# Patient Record
Sex: MALE | Race: WHITE | NOT HISPANIC OR LATINO | Employment: UNEMPLOYED | URBAN - NONMETROPOLITAN AREA
[De-identification: names, ages, dates, MRNs, and addresses within clinical notes are randomized per-mention and may not be internally consistent; named-entity substitution may affect disease eponyms.]

---

## 2021-01-01 ENCOUNTER — TRANSCRIBE ORDERS (OUTPATIENT)
Dept: ADMINISTRATIVE | Facility: HOSPITAL | Age: 56
End: 2021-01-01

## 2021-01-01 ENCOUNTER — HOSPITAL ENCOUNTER (OUTPATIENT)
Dept: GENERAL RADIOLOGY | Facility: HOSPITAL | Age: 56
Discharge: HOME | End: 2021-07-29

## 2021-01-01 DIAGNOSIS — M96.1 POSTLAMINECTOMY SYNDROME, NOT ELSEWHERE CLASSIFIED: ICD-10-CM

## 2021-01-01 DIAGNOSIS — M96.1 POSTLAMINECTOMY SYNDROME, NOT ELSEWHERE CLASSIFIED: Primary | ICD-10-CM

## 2021-01-01 PROCEDURE — 73521 X-RAY EXAM HIPS BI 2 VIEWS: CPT

## 2021-01-01 PROCEDURE — 72100 X-RAY EXAM L-S SPINE 2/3 VWS: CPT | Performed by: RADIOLOGY

## 2021-01-01 PROCEDURE — 72100 X-RAY EXAM L-S SPINE 2/3 VWS: CPT

## 2021-01-01 PROCEDURE — 73521 X-RAY EXAM HIPS BI 2 VIEWS: CPT | Performed by: RADIOLOGY

## 2021-03-04 ENCOUNTER — HOSPITAL ENCOUNTER (EMERGENCY)
Facility: HOSPITAL | Age: 56
Discharge: HOME | End: 2021-03-04
Attending: FAMILY MEDICINE | Admitting: FAMILY MEDICINE

## 2021-03-04 ENCOUNTER — APPOINTMENT (OUTPATIENT)
Dept: CT IMAGING | Facility: HOSPITAL | Age: 56
End: 2021-03-04

## 2021-03-04 ENCOUNTER — APPOINTMENT (OUTPATIENT)
Dept: GENERAL RADIOLOGY | Facility: HOSPITAL | Age: 56
End: 2021-03-04

## 2021-03-04 VITALS
DIASTOLIC BLOOD PRESSURE: 97 MMHG | RESPIRATION RATE: 20 BRPM | HEIGHT: 69 IN | SYSTOLIC BLOOD PRESSURE: 151 MMHG | TEMPERATURE: 97.5 F | BODY MASS INDEX: 22.96 KG/M2 | HEART RATE: 108 BPM | OXYGEN SATURATION: 99 % | WEIGHT: 155 LBS

## 2021-03-04 DIAGNOSIS — S46.911A STRAIN OF RIGHT SHOULDER, INITIAL ENCOUNTER: Primary | ICD-10-CM

## 2021-03-04 PROCEDURE — 73030 X-RAY EXAM OF SHOULDER: CPT | Performed by: RADIOLOGY

## 2021-03-04 PROCEDURE — 73030 X-RAY EXAM OF SHOULDER: CPT

## 2021-03-04 PROCEDURE — 72125 CT NECK SPINE W/O DYE: CPT | Performed by: RADIOLOGY

## 2021-03-04 PROCEDURE — 72125 CT NECK SPINE W/O DYE: CPT

## 2021-03-04 PROCEDURE — 25010000002 KETOROLAC TROMETHAMINE PER 15 MG: Performed by: PHYSICIAN ASSISTANT

## 2021-03-04 PROCEDURE — 99283 EMERGENCY DEPT VISIT LOW MDM: CPT

## 2021-03-04 PROCEDURE — 96372 THER/PROPH/DIAG INJ SC/IM: CPT

## 2021-03-04 RX ORDER — LIDOCAINE 50 MG/G
1 PATCH TOPICAL ONCE
Status: DISCONTINUED | OUTPATIENT
Start: 2021-03-04 | End: 2021-03-04 | Stop reason: HOSPADM

## 2021-03-04 RX ORDER — TIZANIDINE 4 MG/1
4 TABLET ORAL EVERY 8 HOURS PRN
Qty: 30 TABLET | Refills: 0 | Status: SHIPPED | OUTPATIENT
Start: 2021-03-04

## 2021-03-04 RX ORDER — DICLOFENAC SODIUM 75 MG/1
75 TABLET, DELAYED RELEASE ORAL 2 TIMES DAILY
Qty: 30 TABLET | Refills: 0 | Status: SHIPPED | OUTPATIENT
Start: 2021-03-04

## 2021-03-04 RX ORDER — HYDROCODONE BITARTRATE AND ACETAMINOPHEN 7.5; 325 MG/1; MG/1
1 TABLET ORAL EVERY 6 HOURS PRN
Qty: 6 TABLET | Refills: 0 | Status: SHIPPED | OUTPATIENT
Start: 2021-03-04 | End: 2021-03-06 | Stop reason: SDUPTHER

## 2021-03-04 RX ORDER — KETOROLAC TROMETHAMINE 30 MG/ML
60 INJECTION, SOLUTION INTRAMUSCULAR; INTRAVENOUS ONCE
Status: COMPLETED | OUTPATIENT
Start: 2021-03-04 | End: 2021-03-04

## 2021-03-04 RX ADMIN — LIDOCAINE 1 PATCH: 50 PATCH CUTANEOUS at 12:37

## 2021-03-04 RX ADMIN — KETOROLAC TROMETHAMINE 60 MG: 60 INJECTION, SOLUTION INTRAMUSCULAR at 12:37

## 2021-03-04 NOTE — ED PROVIDER NOTES
Subjective     History provided by:  Patient  Shoulder Injury  Location:  Right shoulder / right side neck  Quality:  Pain  Severity:  Moderate  Onset quality:  Sudden  Duration:  12 days  Timing:  Constant  Progression:  Worsening  Chronicity:  New  Context:  Pt slipped on ice falling injuring neck and shoulder.  Neck pain has improved but shoulder pain has gotten worse.   Worsened by:  Movement  Associated symptoms: no abdominal pain, no chest pain and no fever        Review of Systems   Constitutional: Negative.  Negative for fever.   HENT: Negative.    Respiratory: Negative.    Cardiovascular: Negative.  Negative for chest pain.   Gastrointestinal: Negative.  Negative for abdominal pain.   Endocrine: Negative.    Genitourinary: Negative.  Negative for dysuria.   Musculoskeletal: Positive for arthralgias.   Skin: Negative.    Neurological: Negative.    Psychiatric/Behavioral: Negative.    All other systems reviewed and are negative.      No past medical history on file.    Allergies   Allergen Reactions   • Mupirocin Hives       No past surgical history on file.    No family history on file.    Social History     Socioeconomic History   • Marital status: Single     Spouse name: Not on file   • Number of children: Not on file   • Years of education: Not on file   • Highest education level: Not on file           Objective   Physical Exam  Vitals signs and nursing note reviewed.   Constitutional:       General: He is not in acute distress.     Appearance: He is well-developed. He is not diaphoretic.   HENT:      Head: Normocephalic and atraumatic.      Right Ear: External ear normal.      Left Ear: External ear normal.      Nose: Nose normal.   Eyes:      Conjunctiva/sclera: Conjunctivae normal.   Neck:      Musculoskeletal: Normal range of motion and neck supple.      Vascular: No JVD.      Trachea: No tracheal deviation.   Cardiovascular:      Rate and Rhythm: Normal rate and regular rhythm.      Heart sounds: No  murmur.   Pulmonary:      Effort: Pulmonary effort is normal. No respiratory distress.      Breath sounds: No wheezing.   Abdominal:      Palpations: Abdomen is soft.      Tenderness: There is no abdominal tenderness.   Musculoskeletal:         General: Tenderness present. No deformity.      Comments: Pain with elevation of the right upper extremity.  Decreased  strength in right upper extremity.    Skin:     General: Skin is warm and dry.      Coloration: Skin is not pale.      Findings: No erythema or rash.   Neurological:      Mental Status: He is alert and oriented to person, place, and time.      Cranial Nerves: No cranial nerve deficit.   Psychiatric:         Behavior: Behavior normal.         Thought Content: Thought content normal.         Procedures           ED Course  ED Course as of Mar 06 1105   Thu Mar 04, 2021   1359 XR shoulder rad interpreted:  No acute bony abnormality     [RB]   1418 CT cervical spine rad interpreted:  1. No acute bony abnormality.     2. Arthritic change    [RB]      ED Course User Index  [RB] Ernesto Houston II, PA                                           MDM  Number of Diagnoses or Management Options  Strain of right shoulder, initial encounter: new and requires workup     Amount and/or Complexity of Data Reviewed  Tests in the radiology section of CPT®: ordered and reviewed    Risk of Complications, Morbidity, and/or Mortality  Presenting problems: low  Diagnostic procedures: low  Management options: low    Patient Progress  Patient progress: stable      Final diagnoses:   Strain of right shoulder, initial encounter            Ernesto Houston II, PA  03/06/21 1105

## 2021-03-04 NOTE — ED NOTES
Escorted patient to results pending area at this time, instructed patient on split flow process. Patient verbalized understanding, advised to notify staff of any further needs.Verbalized understanding.       Collette, Ashley N, RN  03/04/21 9327

## 2021-03-06 ENCOUNTER — HOSPITAL ENCOUNTER (EMERGENCY)
Facility: HOSPITAL | Age: 56
Discharge: HOME | End: 2021-03-06
Attending: STUDENT IN AN ORGANIZED HEALTH CARE EDUCATION/TRAINING PROGRAM | Admitting: STUDENT IN AN ORGANIZED HEALTH CARE EDUCATION/TRAINING PROGRAM

## 2021-03-06 VITALS
WEIGHT: 155 LBS | RESPIRATION RATE: 18 BRPM | DIASTOLIC BLOOD PRESSURE: 92 MMHG | BODY MASS INDEX: 22.96 KG/M2 | HEIGHT: 69 IN | HEART RATE: 90 BPM | OXYGEN SATURATION: 98 % | TEMPERATURE: 98.8 F | SYSTOLIC BLOOD PRESSURE: 153 MMHG

## 2021-03-06 DIAGNOSIS — M25.511 ACUTE PAIN OF RIGHT SHOULDER: Primary | ICD-10-CM

## 2021-03-06 DIAGNOSIS — S46.911A STRAIN OF RIGHT SHOULDER, INITIAL ENCOUNTER: ICD-10-CM

## 2021-03-06 PROCEDURE — 99283 EMERGENCY DEPT VISIT LOW MDM: CPT

## 2021-03-06 RX ORDER — HYDROCODONE BITARTRATE AND ACETAMINOPHEN 10; 325 MG/1; MG/1
1 TABLET ORAL ONCE
Status: COMPLETED | OUTPATIENT
Start: 2021-03-06 | End: 2021-03-06

## 2021-03-06 RX ORDER — HYDROCODONE BITARTRATE AND ACETAMINOPHEN 7.5; 325 MG/1; MG/1
1 TABLET ORAL EVERY 6 HOURS PRN
Qty: 10 TABLET | Refills: 0 | Status: ON HOLD | OUTPATIENT
Start: 2021-03-06 | End: 2022-01-01

## 2021-03-06 RX ADMIN — HYDROCODONE BITARTRATE AND ACETAMINOPHEN 1 TABLET: 10; 325 TABLET ORAL at 14:25

## 2021-03-06 NOTE — ED PROVIDER NOTES
Subjective     History provided by:  Patient  Arm Pain  Location:  Right shoulder  Quality:  Pain  Severity:  Moderate  Onset quality:  Gradual  Duration:  2 weeks  Timing:  Constant  Progression:  Worsening  Chronicity:  New  Context:  Slipped in fell on ice  Associated symptoms: no abdominal pain, no chest pain and no fever        Review of Systems   Constitutional: Negative.  Negative for fever.   HENT: Negative.    Respiratory: Negative.    Cardiovascular: Negative.  Negative for chest pain.   Gastrointestinal: Negative.  Negative for abdominal pain.   Endocrine: Negative.    Genitourinary: Negative.  Negative for dysuria.   Musculoskeletal: Positive for arthralgias.   Skin: Negative.    Neurological: Negative.    Psychiatric/Behavioral: Negative.    All other systems reviewed and are negative.      No past medical history on file.    Allergies   Allergen Reactions   • Mupirocin Hives       No past surgical history on file.    No family history on file.    Social History     Socioeconomic History   • Marital status: Single     Spouse name: Not on file   • Number of children: Not on file   • Years of education: Not on file   • Highest education level: Not on file           Objective   Physical Exam  Vitals and nursing note reviewed.   Constitutional:       General: He is not in acute distress.     Appearance: He is well-developed. He is not diaphoretic.   HENT:      Head: Normocephalic and atraumatic.      Right Ear: External ear normal.      Left Ear: External ear normal.      Nose: Nose normal.   Eyes:      Conjunctiva/sclera: Conjunctivae normal.   Neck:      Vascular: No JVD.      Trachea: No tracheal deviation.   Cardiovascular:      Rate and Rhythm: Normal rate and regular rhythm.      Heart sounds: No murmur.   Pulmonary:      Effort: Pulmonary effort is normal. No respiratory distress.      Breath sounds: No wheezing.   Abdominal:      Palpations: Abdomen is soft.      Tenderness: There is no abdominal  tenderness.   Musculoskeletal:         General: Tenderness present. No deformity.      Cervical back: Normal range of motion and neck supple.      Comments: Patient still has a sling that was applied on March 4.  Patient verbalized that he is out of the pain medication that was written.  Patient is scheduled to see Dr. Peterson end of this upcoming week.   Skin:     General: Skin is warm and dry.      Coloration: Skin is not pale.      Findings: No erythema or rash.   Neurological:      Mental Status: He is alert and oriented to person, place, and time.      Cranial Nerves: No cranial nerve deficit.   Psychiatric:         Behavior: Behavior normal.         Thought Content: Thought content normal.         Procedures           ED Course                                           MDM  Number of Diagnoses or Management Options  Acute pain of right shoulder: established and worsening  Risk of Complications, Morbidity, and/or Mortality  Presenting problems: low  Diagnostic procedures: low  Management options: low        Final diagnoses:   Acute pain of right shoulder            Ernesto Houston II, PA  03/06/21 2399

## 2021-03-15 ENCOUNTER — BULK ORDERING (OUTPATIENT)
Dept: CASE MANAGEMENT | Facility: OTHER | Age: 56
End: 2021-03-15

## 2021-03-15 DIAGNOSIS — Z23 IMMUNIZATION DUE: ICD-10-CM

## 2021-03-16 ENCOUNTER — IMMUNIZATION (OUTPATIENT)
Dept: VACCINE CLINIC | Facility: HOSPITAL | Age: 56
End: 2021-03-16

## 2021-03-16 PROCEDURE — 0001A: CPT | Performed by: INTERNAL MEDICINE

## 2021-03-16 PROCEDURE — 91300 HC SARSCOV02 VAC 30MCG/0.3ML IM: CPT | Performed by: INTERNAL MEDICINE

## 2021-04-06 ENCOUNTER — IMMUNIZATION (OUTPATIENT)
Dept: VACCINE CLINIC | Facility: HOSPITAL | Age: 56
End: 2021-04-06

## 2021-04-06 PROCEDURE — 0002A: CPT | Performed by: INTERNAL MEDICINE

## 2021-04-06 PROCEDURE — 91300 HC SARSCOV02 VAC 30MCG/0.3ML IM: CPT | Performed by: INTERNAL MEDICINE

## 2022-01-01 ENCOUNTER — DOCUMENTATION (OUTPATIENT)
Dept: ONCOLOGY | Facility: HOSPITAL | Age: 57
End: 2022-01-01

## 2022-01-01 ENCOUNTER — APPOINTMENT (OUTPATIENT)
Dept: ONCOLOGY | Facility: HOSPITAL | Age: 57
End: 2022-01-01

## 2022-01-01 ENCOUNTER — TELEPHONE (OUTPATIENT)
Dept: ONCOLOGY | Facility: CLINIC | Age: 57
End: 2022-01-01

## 2022-01-01 ENCOUNTER — HOSPITAL ENCOUNTER (OUTPATIENT)
Dept: MRI IMAGING | Facility: HOSPITAL | Age: 57
Discharge: HOME | End: 2022-02-08

## 2022-01-01 ENCOUNTER — INFUSION (OUTPATIENT)
Dept: ONCOLOGY | Facility: HOSPITAL | Age: 57
End: 2022-01-01

## 2022-01-01 ENCOUNTER — OFFICE VISIT (OUTPATIENT)
Dept: ONCOLOGY | Facility: CLINIC | Age: 57
End: 2022-01-01

## 2022-01-01 ENCOUNTER — HOSPITAL ENCOUNTER (INPATIENT)
Facility: HOSPITAL | Age: 57
LOS: 1 days | End: 2022-04-23
Attending: EMERGENCY MEDICINE | Admitting: INTERNAL MEDICINE

## 2022-01-01 ENCOUNTER — OFFICE VISIT (OUTPATIENT)
Dept: RADIATION ONCOLOGY | Facility: HOSPITAL | Age: 57
End: 2022-01-01

## 2022-01-01 ENCOUNTER — PATIENT ROUNDING (BHMG ONLY) (OUTPATIENT)
Dept: ONCOLOGY | Facility: CLINIC | Age: 57
End: 2022-01-01

## 2022-01-01 ENCOUNTER — APPOINTMENT (OUTPATIENT)
Dept: GENERAL RADIOLOGY | Facility: HOSPITAL | Age: 57
End: 2022-01-01

## 2022-01-01 ENCOUNTER — CONSULT (OUTPATIENT)
Dept: ONCOLOGY | Facility: CLINIC | Age: 57
End: 2022-01-01

## 2022-01-01 ENCOUNTER — LAB (OUTPATIENT)
Dept: ONCOLOGY | Facility: CLINIC | Age: 57
End: 2022-01-01

## 2022-01-01 ENCOUNTER — HOSPITAL ENCOUNTER (OUTPATIENT)
Dept: PET IMAGING | Facility: HOSPITAL | Age: 57
End: 2022-01-01

## 2022-01-01 ENCOUNTER — APPOINTMENT (OUTPATIENT)
Dept: RADIATION ONCOLOGY | Facility: HOSPITAL | Age: 57
End: 2022-01-01

## 2022-01-01 ENCOUNTER — APPOINTMENT (OUTPATIENT)
Dept: PREADMISSION TESTING | Facility: HOSPITAL | Age: 57
End: 2022-01-01

## 2022-01-01 ENCOUNTER — TELEPHONE (OUTPATIENT)
Dept: SURGERY | Facility: CLINIC | Age: 57
End: 2022-01-01

## 2022-01-01 ENCOUNTER — TELEPHONE (OUTPATIENT)
Dept: ONCOLOGY | Facility: HOSPITAL | Age: 57
End: 2022-01-01

## 2022-01-01 ENCOUNTER — ANESTHESIA (OUTPATIENT)
Dept: PERIOP | Facility: HOSPITAL | Age: 57
End: 2022-01-01

## 2022-01-01 ENCOUNTER — OFFICE VISIT (OUTPATIENT)
Dept: SURGERY | Facility: CLINIC | Age: 57
End: 2022-01-01

## 2022-01-01 ENCOUNTER — ANESTHESIA EVENT (OUTPATIENT)
Dept: PERIOP | Facility: HOSPITAL | Age: 57
End: 2022-01-01

## 2022-01-01 ENCOUNTER — PREP FOR SURGERY (OUTPATIENT)
Dept: OTHER | Facility: HOSPITAL | Age: 57
End: 2022-01-01

## 2022-01-01 ENCOUNTER — DOCUMENTATION (OUTPATIENT)
Dept: ONCOLOGY | Facility: CLINIC | Age: 57
End: 2022-01-01

## 2022-01-01 ENCOUNTER — LAB (OUTPATIENT)
Dept: LAB | Facility: HOSPITAL | Age: 57
End: 2022-01-01

## 2022-01-01 ENCOUNTER — HOSPITAL ENCOUNTER (OUTPATIENT)
Dept: WOUND CARE | Facility: HOSPITAL | Age: 57
Discharge: HOME | End: 2022-03-28
Admitting: NURSE PRACTITIONER

## 2022-01-01 ENCOUNTER — HOSPITAL ENCOUNTER (OUTPATIENT)
Dept: PET IMAGING | Facility: HOSPITAL | Age: 57
Discharge: HOME | End: 2022-02-18
Admitting: INTERNAL MEDICINE

## 2022-01-01 ENCOUNTER — HOSPITAL ENCOUNTER (OUTPATIENT)
Facility: HOSPITAL | Age: 57
Setting detail: HOSPITAL OUTPATIENT SURGERY
Discharge: HOME | End: 2022-02-03
Attending: SURGERY | Admitting: SURGERY

## 2022-01-01 ENCOUNTER — APPOINTMENT (OUTPATIENT)
Dept: MRI IMAGING | Facility: HOSPITAL | Age: 57
End: 2022-01-01

## 2022-01-01 ENCOUNTER — HOSPITAL ENCOUNTER (OUTPATIENT)
Dept: WOUND CARE | Facility: HOSPITAL | Age: 57
Discharge: HOME | End: 2022-03-04
Admitting: NURSE PRACTITIONER

## 2022-01-01 ENCOUNTER — HOSPITAL ENCOUNTER (OUTPATIENT)
Dept: WOUND CARE | Facility: HOSPITAL | Age: 57
Discharge: HOME | End: 2022-04-11
Admitting: NURSE PRACTITIONER

## 2022-01-01 ENCOUNTER — CONSULT (OUTPATIENT)
Dept: RADIATION ONCOLOGY | Facility: HOSPITAL | Age: 57
End: 2022-01-01

## 2022-01-01 VITALS
TEMPERATURE: 97.9 F | HEIGHT: 70 IN | BODY MASS INDEX: 21.93 KG/M2 | HEART RATE: 128 BPM | SYSTOLIC BLOOD PRESSURE: 141 MMHG | OXYGEN SATURATION: 99 % | DIASTOLIC BLOOD PRESSURE: 84 MMHG | RESPIRATION RATE: 18 BRPM | WEIGHT: 153.2 LBS

## 2022-01-01 VITALS
DIASTOLIC BLOOD PRESSURE: 70 MMHG | RESPIRATION RATE: 18 BRPM | HEART RATE: 150 BPM | WEIGHT: 127 LBS | TEMPERATURE: 96.9 F | HEIGHT: 69 IN | SYSTOLIC BLOOD PRESSURE: 104 MMHG | BODY MASS INDEX: 18.81 KG/M2 | OXYGEN SATURATION: 96 %

## 2022-01-01 VITALS
DIASTOLIC BLOOD PRESSURE: 59 MMHG | OXYGEN SATURATION: 95 % | SYSTOLIC BLOOD PRESSURE: 101 MMHG | TEMPERATURE: 97.7 F | RESPIRATION RATE: 20 BRPM

## 2022-01-01 VITALS
RESPIRATION RATE: 18 BRPM | HEART RATE: 128 BPM | OXYGEN SATURATION: 99 % | BODY MASS INDEX: 16.07 KG/M2 | SYSTOLIC BLOOD PRESSURE: 100 MMHG | DIASTOLIC BLOOD PRESSURE: 59 MMHG | WEIGHT: 108.8 LBS | TEMPERATURE: 96.9 F

## 2022-01-01 VITALS
BODY MASS INDEX: 16.07 KG/M2 | RESPIRATION RATE: 18 BRPM | WEIGHT: 108.8 LBS | DIASTOLIC BLOOD PRESSURE: 60 MMHG | OXYGEN SATURATION: 99 % | HEART RATE: 155 BPM | SYSTOLIC BLOOD PRESSURE: 91 MMHG | TEMPERATURE: 96.9 F

## 2022-01-01 VITALS
TEMPERATURE: 97.1 F | DIASTOLIC BLOOD PRESSURE: 72 MMHG | OXYGEN SATURATION: 96 % | RESPIRATION RATE: 18 BRPM | HEART RATE: 133 BPM | SYSTOLIC BLOOD PRESSURE: 116 MMHG

## 2022-01-01 VITALS
DIASTOLIC BLOOD PRESSURE: 56 MMHG | SYSTOLIC BLOOD PRESSURE: 83 MMHG | WEIGHT: 124 LBS | BODY MASS INDEX: 18.31 KG/M2 | HEART RATE: 88 BPM | OXYGEN SATURATION: 96 % | RESPIRATION RATE: 18 BRPM | TEMPERATURE: 97.7 F

## 2022-01-01 VITALS
TEMPERATURE: 98 F | WEIGHT: 128 LBS | DIASTOLIC BLOOD PRESSURE: 67 MMHG | BODY MASS INDEX: 18.9 KG/M2 | OXYGEN SATURATION: 99 % | RESPIRATION RATE: 18 BRPM | SYSTOLIC BLOOD PRESSURE: 109 MMHG | HEART RATE: 50 BPM

## 2022-01-01 VITALS
HEIGHT: 69 IN | OXYGEN SATURATION: 99 % | TEMPERATURE: 96.1 F | RESPIRATION RATE: 26 BRPM | SYSTOLIC BLOOD PRESSURE: 115 MMHG | DIASTOLIC BLOOD PRESSURE: 90 MMHG | BODY MASS INDEX: 16 KG/M2 | WEIGHT: 108 LBS | HEART RATE: 92 BPM

## 2022-01-01 VITALS
HEART RATE: 122 BPM | RESPIRATION RATE: 18 BRPM | OXYGEN SATURATION: 91 % | DIASTOLIC BLOOD PRESSURE: 68 MMHG | SYSTOLIC BLOOD PRESSURE: 100 MMHG | TEMPERATURE: 96.8 F

## 2022-01-01 VITALS
HEART RATE: 124 BPM | DIASTOLIC BLOOD PRESSURE: 56 MMHG | SYSTOLIC BLOOD PRESSURE: 102 MMHG | TEMPERATURE: 97.6 F | RESPIRATION RATE: 18 BRPM

## 2022-01-01 VITALS
SYSTOLIC BLOOD PRESSURE: 122 MMHG | DIASTOLIC BLOOD PRESSURE: 80 MMHG | HEIGHT: 70 IN | WEIGHT: 154 LBS | BODY MASS INDEX: 22.05 KG/M2

## 2022-01-01 VITALS
OXYGEN SATURATION: 94 % | SYSTOLIC BLOOD PRESSURE: 119 MMHG | BODY MASS INDEX: 19.23 KG/M2 | TEMPERATURE: 97.5 F | RESPIRATION RATE: 18 BRPM | DIASTOLIC BLOOD PRESSURE: 73 MMHG | WEIGHT: 129.8 LBS | HEIGHT: 69 IN | HEART RATE: 118 BPM

## 2022-01-01 VITALS
OXYGEN SATURATION: 98 % | RESPIRATION RATE: 18 BRPM | DIASTOLIC BLOOD PRESSURE: 90 MMHG | HEART RATE: 127 BPM | WEIGHT: 153 LBS | TEMPERATURE: 98.4 F | SYSTOLIC BLOOD PRESSURE: 142 MMHG | BODY MASS INDEX: 21.95 KG/M2

## 2022-01-01 VITALS
HEART RATE: 154 BPM | OXYGEN SATURATION: 92 % | SYSTOLIC BLOOD PRESSURE: 89 MMHG | DIASTOLIC BLOOD PRESSURE: 64 MMHG | RESPIRATION RATE: 18 BRPM | TEMPERATURE: 97.1 F

## 2022-01-01 VITALS
SYSTOLIC BLOOD PRESSURE: 110 MMHG | HEIGHT: 70 IN | TEMPERATURE: 97.7 F | WEIGHT: 152.6 LBS | DIASTOLIC BLOOD PRESSURE: 75 MMHG | RESPIRATION RATE: 12 BRPM | HEART RATE: 91 BPM | BODY MASS INDEX: 21.85 KG/M2 | OXYGEN SATURATION: 95 %

## 2022-01-01 VITALS
DIASTOLIC BLOOD PRESSURE: 77 MMHG | TEMPERATURE: 97 F | RESPIRATION RATE: 18 BRPM | OXYGEN SATURATION: 98 % | HEART RATE: 136 BPM | SYSTOLIC BLOOD PRESSURE: 119 MMHG

## 2022-01-01 VITALS
SYSTOLIC BLOOD PRESSURE: 89 MMHG | OXYGEN SATURATION: 99 % | HEART RATE: 134 BPM | TEMPERATURE: 97.3 F | WEIGHT: 122 LBS | DIASTOLIC BLOOD PRESSURE: 66 MMHG | RESPIRATION RATE: 18 BRPM | BODY MASS INDEX: 18.02 KG/M2

## 2022-01-01 VITALS
SYSTOLIC BLOOD PRESSURE: 110 MMHG | RESPIRATION RATE: 18 BRPM | DIASTOLIC BLOOD PRESSURE: 62 MMHG | HEART RATE: 68 BPM | TEMPERATURE: 98.8 F

## 2022-01-01 VITALS
SYSTOLIC BLOOD PRESSURE: 113 MMHG | TEMPERATURE: 97.7 F | WEIGHT: 118.9 LBS | OXYGEN SATURATION: 95 % | BODY MASS INDEX: 17.56 KG/M2 | DIASTOLIC BLOOD PRESSURE: 62 MMHG | RESPIRATION RATE: 18 BRPM | HEART RATE: 116 BPM

## 2022-01-01 VITALS
BODY MASS INDEX: 17.56 KG/M2 | HEART RATE: 116 BPM | BODY MASS INDEX: 18.02 KG/M2 | SYSTOLIC BLOOD PRESSURE: 89 MMHG | TEMPERATURE: 97.3 F | RESPIRATION RATE: 18 BRPM | WEIGHT: 118.9 LBS | RESPIRATION RATE: 18 BRPM | HEART RATE: 134 BPM | DIASTOLIC BLOOD PRESSURE: 68 MMHG | DIASTOLIC BLOOD PRESSURE: 66 MMHG | SYSTOLIC BLOOD PRESSURE: 101 MMHG | OXYGEN SATURATION: 99 % | OXYGEN SATURATION: 97 % | TEMPERATURE: 97.7 F | WEIGHT: 122 LBS

## 2022-01-01 VITALS
HEIGHT: 70 IN | BODY MASS INDEX: 22.05 KG/M2 | WEIGHT: 154 LBS | SYSTOLIC BLOOD PRESSURE: 118 MMHG | DIASTOLIC BLOOD PRESSURE: 70 MMHG

## 2022-01-01 VITALS
RESPIRATION RATE: 18 BRPM | OXYGEN SATURATION: 99 % | DIASTOLIC BLOOD PRESSURE: 61 MMHG | HEART RATE: 160 BPM | SYSTOLIC BLOOD PRESSURE: 86 MMHG | TEMPERATURE: 96.9 F

## 2022-01-01 VITALS
RESPIRATION RATE: 18 BRPM | TEMPERATURE: 97 F | SYSTOLIC BLOOD PRESSURE: 77 MMHG | DIASTOLIC BLOOD PRESSURE: 52 MMHG | HEART RATE: 123 BPM | OXYGEN SATURATION: 98 %

## 2022-01-01 DIAGNOSIS — D69.6 THROMBOCYTOPENIA: ICD-10-CM

## 2022-01-01 DIAGNOSIS — C21.0 ANAL SQUAMOUS CELL CARCINOMA: Primary | ICD-10-CM

## 2022-01-01 DIAGNOSIS — C21.0 ANAL SQUAMOUS CELL CARCINOMA: ICD-10-CM

## 2022-01-01 DIAGNOSIS — E87.6 HYPOKALEMIA: ICD-10-CM

## 2022-01-01 DIAGNOSIS — J96.01 ACUTE RESPIRATORY FAILURE WITH HYPOXIA AND HYPERCAPNIA: ICD-10-CM

## 2022-01-01 DIAGNOSIS — E83.52 HYPERCALCEMIA: ICD-10-CM

## 2022-01-01 DIAGNOSIS — C79.51 METASTASIS TO BONE: Primary | ICD-10-CM

## 2022-01-01 DIAGNOSIS — L98.9 SKIN LESION: ICD-10-CM

## 2022-01-01 DIAGNOSIS — C79.51 METASTASIS TO BONE: ICD-10-CM

## 2022-01-01 DIAGNOSIS — G89.3 NEOPLASM RELATED PAIN: ICD-10-CM

## 2022-01-01 DIAGNOSIS — E53.8 FOLIC ACID DEFICIENCY: ICD-10-CM

## 2022-01-01 DIAGNOSIS — C21.0 ANAL CANCER: ICD-10-CM

## 2022-01-01 DIAGNOSIS — J96.02 ACUTE RESPIRATORY FAILURE WITH HYPOXIA AND HYPERCAPNIA: ICD-10-CM

## 2022-01-01 DIAGNOSIS — E87.20 LACTIC ACIDOSIS: ICD-10-CM

## 2022-01-01 DIAGNOSIS — G89.3 CHRONIC PAIN DUE TO MALIGNANT NEOPLASTIC DISEASE: ICD-10-CM

## 2022-01-01 DIAGNOSIS — R09.2 RESPIRATORY ARREST: Primary | ICD-10-CM

## 2022-01-01 DIAGNOSIS — L73.2 HYDRADENITIS: ICD-10-CM

## 2022-01-01 DIAGNOSIS — T82.868A THROMBOSIS INVOLVING VASCULAR DEVICE, INITIAL ENCOUNTER: Primary | ICD-10-CM

## 2022-01-01 DIAGNOSIS — D64.9 SEVERE ANEMIA: ICD-10-CM

## 2022-01-01 DIAGNOSIS — C21.0 SQUAMOUS CELL CARCINOMA OF ANUS: ICD-10-CM

## 2022-01-01 DIAGNOSIS — C79.51 BONE METASTASES: ICD-10-CM

## 2022-01-01 DIAGNOSIS — K61.0 ANAL ABSCESS: Primary | ICD-10-CM

## 2022-01-01 DIAGNOSIS — R40.2431 GLASGOW COMA SCALE TOTAL SCORE 3-8, IN THE FIELD (EMT OR AMBULANCE): ICD-10-CM

## 2022-01-01 DIAGNOSIS — G89.3 CHRONIC PAIN DUE TO MALIGNANT NEOPLASTIC DISEASE: Primary | ICD-10-CM

## 2022-01-01 DIAGNOSIS — C21.0 ANAL CANCER: Primary | ICD-10-CM

## 2022-01-01 LAB
A-A DO2: 554.7 MMHG (ref 0–300)
A-A DO2: 603.8 MMHG (ref 0–300)
ABO GROUP BLD: NORMAL
ABO GROUP BLD: NORMAL
ALBUMIN SERPL-MCNC: 1.75 G/DL (ref 3.5–5.2)
ALBUMIN SERPL-MCNC: 2.28 G/DL (ref 3.5–5.2)
ALBUMIN SERPL-MCNC: 2.29 G/DL (ref 3.5–5.2)
ALBUMIN SERPL-MCNC: 2.3 G/DL (ref 3.5–5.2)
ALBUMIN SERPL-MCNC: 3.04 G/DL (ref 3.5–5.2)
ALBUMIN/GLOB SERPL: 0.4 G/DL
ALBUMIN/GLOB SERPL: 0.6 G/DL
ALP SERPL-CCNC: 104 U/L (ref 39–117)
ALP SERPL-CCNC: 124 U/L (ref 39–117)
ALP SERPL-CCNC: 129 U/L (ref 39–117)
ALP SERPL-CCNC: 146 U/L (ref 39–117)
ALP SERPL-CCNC: 164 U/L (ref 39–117)
ALT SERPL W P-5'-P-CCNC: 13 U/L (ref 1–41)
ALT SERPL W P-5'-P-CCNC: 16 U/L (ref 1–41)
ALT SERPL W P-5'-P-CCNC: 28 U/L (ref 1–41)
ALT SERPL W P-5'-P-CCNC: 307 U/L (ref 1–41)
ALT SERPL W P-5'-P-CCNC: 6 U/L (ref 1–41)
ANION GAP SERPL CALCULATED.3IONS-SCNC: 11.5 MMOL/L (ref 5–15)
ANION GAP SERPL CALCULATED.3IONS-SCNC: 13.7 MMOL/L (ref 5–15)
ANION GAP SERPL CALCULATED.3IONS-SCNC: 14.5 MMOL/L (ref 5–15)
ANION GAP SERPL CALCULATED.3IONS-SCNC: 38.5 MMOL/L (ref 5–15)
ANION GAP SERPL CALCULATED.3IONS-SCNC: 8 MMOL/L (ref 5–15)
ANISOCYTOSIS BLD QL: ABNORMAL
ANISOCYTOSIS BLD QL: ABNORMAL
ARTERIAL PATENCY WRIST A: POSITIVE
ARTERIAL PATENCY WRIST A: POSITIVE
AST SERPL-CCNC: 11 U/L (ref 1–40)
AST SERPL-CCNC: 12 U/L (ref 1–40)
AST SERPL-CCNC: 19 U/L (ref 1–40)
AST SERPL-CCNC: 26 U/L (ref 1–40)
AST SERPL-CCNC: 817 U/L (ref 1–40)
ATMOSPHERIC PRESS: 733 MMHG
ATMOSPHERIC PRESS: 733 MMHG
BACTERIA SPEC AEROBE CULT: ABNORMAL
BACTERIA SPEC AEROBE CULT: ABNORMAL
BACTERIA UR QL AUTO: ABNORMAL /HPF
BASE EXCESS BLDA CALC-SCNC: -17.3 MMOL/L (ref 0–2)
BASE EXCESS BLDA CALC-SCNC: -24.3 MMOL/L (ref 0–2)
BASOPHILS # BLD AUTO: 0.06 10*3/MM3 (ref 0–0.2)
BASOPHILS # BLD AUTO: 0.06 10*3/MM3 (ref 0–0.2)
BASOPHILS # BLD AUTO: 0.1 10*3/MM3 (ref 0–0.2)
BASOPHILS NFR BLD AUTO: 0.2 % (ref 0–1.5)
BASOPHILS NFR BLD AUTO: 0.3 % (ref 0–1.5)
BASOPHILS NFR BLD AUTO: 0.4 % (ref 0–1.5)
BDY SITE: ABNORMAL
BDY SITE: ABNORMAL
BILIRUB SERPL-MCNC: 0.2 MG/DL (ref 0–1.2)
BILIRUB SERPL-MCNC: 0.3 MG/DL (ref 0–1.2)
BILIRUB SERPL-MCNC: 0.3 MG/DL (ref 0–1.2)
BILIRUB SERPL-MCNC: 0.4 MG/DL (ref 0–1.2)
BILIRUB SERPL-MCNC: 0.7 MG/DL (ref 0–1.2)
BILIRUB UR QL STRIP: NEGATIVE
BLD GP AB SCN SERPL QL: NEGATIVE
BODY TEMPERATURE: 0 C
BODY TEMPERATURE: 0 C
BUN SERPL-MCNC: 10 MG/DL (ref 6–20)
BUN SERPL-MCNC: 12 MG/DL (ref 6–20)
BUN SERPL-MCNC: 14 MG/DL (ref 6–20)
BUN SERPL-MCNC: 27 MG/DL (ref 6–20)
BUN SERPL-MCNC: 8 MG/DL (ref 6–20)
BUN/CREAT SERPL: 12.1 (ref 7–25)
BUN/CREAT SERPL: 17.2 (ref 7–25)
BUN/CREAT SERPL: 17.6 (ref 7–25)
BUN/CREAT SERPL: 22.2 (ref 7–25)
BUN/CREAT SERPL: 27.6 (ref 7–25)
CALCIUM SPEC-SCNC: 10.1 MG/DL (ref 8.6–10.5)
CALCIUM SPEC-SCNC: 10.8 MG/DL (ref 8.6–10.5)
CALCIUM SPEC-SCNC: 10.9 MG/DL (ref 8.6–10.5)
CALCIUM SPEC-SCNC: 8.8 MG/DL (ref 8.6–10.5)
CALCIUM SPEC-SCNC: 9.4 MG/DL (ref 8.6–10.5)
CHLORIDE SERPL-SCNC: 101 MMOL/L (ref 98–107)
CHLORIDE SERPL-SCNC: 91 MMOL/L (ref 98–107)
CHLORIDE SERPL-SCNC: 92 MMOL/L (ref 98–107)
CHLORIDE SERPL-SCNC: 92 MMOL/L (ref 98–107)
CHLORIDE SERPL-SCNC: 98 MMOL/L (ref 98–107)
CLARITY UR: ABNORMAL
CO2 BLDA-SCNC: 11.5 MMOL/L (ref 22–33)
CO2 BLDA-SCNC: 11.6 MMOL/L (ref 22–33)
CO2 SERPL-SCNC: 24.3 MMOL/L (ref 22–29)
CO2 SERPL-SCNC: 24.5 MMOL/L (ref 22–29)
CO2 SERPL-SCNC: 26.5 MMOL/L (ref 22–29)
CO2 SERPL-SCNC: 30 MMOL/L (ref 22–29)
CO2 SERPL-SCNC: 8.5 MMOL/L (ref 22–29)
COHGB MFR BLD: 1.3 % (ref 0–5)
COHGB MFR BLD: 1.3 % (ref 0–5)
COLOR UR: ABNORMAL
CREAT SERPL-MCNC: 0.58 MG/DL (ref 0.76–1.27)
CREAT SERPL-MCNC: 0.63 MG/DL (ref 0.76–1.27)
CREAT SERPL-MCNC: 0.66 MG/DL (ref 0.76–1.27)
CREAT SERPL-MCNC: 0.68 MG/DL (ref 0.76–1.27)
CREAT SERPL-MCNC: 0.98 MG/DL (ref 0.76–1.27)
D DIMER PPP FEU-MCNC: 1.83 MCGFEU/ML (ref 0–0.5)
D-LACTATE SERPL-SCNC: 29.9 MMOL/L (ref 0.5–2)
DEPRECATED RDW RBC AUTO: 47.9 FL (ref 37–54)
DEPRECATED RDW RBC AUTO: 50.5 FL (ref 37–54)
DEPRECATED RDW RBC AUTO: 50.7 FL (ref 37–54)
DEPRECATED RDW RBC AUTO: 57 FL (ref 37–54)
DEPRECATED RDW RBC AUTO: 71 FL (ref 37–54)
DOHLE BODIES: PRESENT
EGFRCR SERPLBLD CKD-EPI 2021: 109.1 ML/MIN/1.73
EGFRCR SERPLBLD CKD-EPI 2021: 111.6 ML/MIN/1.73
EGFRCR SERPLBLD CKD-EPI 2021: 114.5 ML/MIN/1.73
EGFRCR SERPLBLD CKD-EPI 2021: 90.5 ML/MIN/1.73
EOSINOPHIL # BLD AUTO: 0.02 10*3/MM3 (ref 0–0.4)
EOSINOPHIL # BLD AUTO: 0.07 10*3/MM3 (ref 0–0.4)
EOSINOPHIL # BLD AUTO: 0.14 10*3/MM3 (ref 0–0.4)
EOSINOPHIL NFR BLD AUTO: 0.1 % (ref 0.3–6.2)
EOSINOPHIL NFR BLD AUTO: 0.3 % (ref 0.3–6.2)
EOSINOPHIL NFR BLD AUTO: 0.8 % (ref 0.3–6.2)
EPAP: 8
ERYTHROCYTE [DISTWIDTH] IN BLOOD BY AUTOMATED COUNT: 15 % (ref 12.3–15.4)
ERYTHROCYTE [DISTWIDTH] IN BLOOD BY AUTOMATED COUNT: 17.2 % (ref 12.3–15.4)
ERYTHROCYTE [DISTWIDTH] IN BLOOD BY AUTOMATED COUNT: 17.4 % (ref 12.3–15.4)
ERYTHROCYTE [DISTWIDTH] IN BLOOD BY AUTOMATED COUNT: 19.5 % (ref 12.3–15.4)
ERYTHROCYTE [DISTWIDTH] IN BLOOD BY AUTOMATED COUNT: 19.6 % (ref 12.3–15.4)
FERRITIN SERPL-MCNC: 323.2 NG/ML (ref 30–400)
FLUAV RNA RESP QL NAA+PROBE: NOT DETECTED
FLUAV SUBTYP SPEC NAA+PROBE: NOT DETECTED
FLUBV RNA ISLT QL NAA+PROBE: NOT DETECTED
FLUBV RNA RESP QL NAA+PROBE: NOT DETECTED
FOLATE SERPL-MCNC: 4.07 NG/ML (ref 4.78–24.2)
GAS FLOW AIRWAY: 15 LPM
GFR SERPL CREATININE-BSD FRML MDRD: 125 ML/MIN/1.73
GLOBULIN UR ELPH-MCNC: 4.3 GM/DL
GLOBULIN UR ELPH-MCNC: 5.1 GM/DL
GLOBULIN UR ELPH-MCNC: 5.1 GM/DL
GLOBULIN UR ELPH-MCNC: 5.3 GM/DL
GLOBULIN UR ELPH-MCNC: 5.6 GM/DL
GLUCOSE SERPL-MCNC: 100 MG/DL (ref 65–99)
GLUCOSE SERPL-MCNC: 115 MG/DL (ref 65–99)
GLUCOSE SERPL-MCNC: 124 MG/DL (ref 65–99)
GLUCOSE SERPL-MCNC: 138 MG/DL (ref 65–99)
GLUCOSE SERPL-MCNC: 174 MG/DL (ref 65–99)
GLUCOSE UR STRIP-MCNC: NEGATIVE MG/DL
GRAM STN SPEC: ABNORMAL
HCO3 BLDA-SCNC: 10.6 MMOL/L (ref 20–26)
HCO3 BLDA-SCNC: 9.3 MMOL/L (ref 20–26)
HCT VFR BLD AUTO: 20 % (ref 37.5–51)
HCT VFR BLD AUTO: 26.3 % (ref 37.5–51)
HCT VFR BLD AUTO: 27.5 % (ref 37.5–51)
HCT VFR BLD AUTO: 29.7 % (ref 37.5–51)
HCT VFR BLD AUTO: 34 % (ref 37.5–51)
HCT VFR BLD CALC: 15 % (ref 38–51)
HCT VFR BLD CALC: 17.3 % (ref 38–51)
HGB BLD-MCNC: 10.6 G/DL (ref 13–17.7)
HGB BLD-MCNC: 4.9 G/DL (ref 13–17.7)
HGB BLD-MCNC: 8 G/DL (ref 13–17.7)
HGB BLD-MCNC: 8.3 G/DL (ref 13–17.7)
HGB BLD-MCNC: 9.1 G/DL (ref 13–17.7)
HGB BLDA-MCNC: 5.6 G/DL (ref 14–18)
HGB BLDA-MCNC: <4.9 G/DL (ref 14–18)
HGB UR QL STRIP.AUTO: ABNORMAL
HIV1+2 AB SER QL: NORMAL
HYALINE CASTS UR QL AUTO: ABNORMAL /LPF
HYPOCHROMIA BLD QL: ABNORMAL
HYPOCHROMIA BLD QL: ABNORMAL
IMM GRANULOCYTES # BLD AUTO: 0.07 10*3/MM3 (ref 0–0.05)
IMM GRANULOCYTES # BLD AUTO: 0.23 10*3/MM3 (ref 0–0.05)
IMM GRANULOCYTES # BLD AUTO: 0.5 10*3/MM3 (ref 0–0.05)
IMM GRANULOCYTES NFR BLD AUTO: 0.4 % (ref 0–0.5)
IMM GRANULOCYTES NFR BLD AUTO: 0.9 % (ref 0–0.5)
IMM GRANULOCYTES NFR BLD AUTO: 1.6 % (ref 0–0.5)
INHALED O2 CONCENTRATION: 100 %
INHALED O2 CONCENTRATION: 100 %
IPAP: 16
IRON 24H UR-MRATE: 10 MCG/DL (ref 59–158)
IRON SATN MFR SERPL: 4 % (ref 20–50)
KETONES UR QL STRIP: NEGATIVE
LARGE PLATELETS: ABNORMAL
LEUKOCYTE ESTERASE UR QL STRIP.AUTO: NEGATIVE
LYMPHOCYTES # BLD AUTO: 2.13 10*3/MM3 (ref 0.7–3.1)
LYMPHOCYTES # BLD AUTO: 2.4 10*3/MM3 (ref 0.7–3.1)
LYMPHOCYTES # BLD AUTO: 2.6 10*3/MM3 (ref 0.7–3.1)
LYMPHOCYTES # BLD MANUAL: 0.55 10*3/MM3 (ref 0.7–3.1)
LYMPHOCYTES # BLD MANUAL: 2.3 10*3/MM3 (ref 0.7–3.1)
LYMPHOCYTES NFR BLD AUTO: 12.5 % (ref 19.6–45.3)
LYMPHOCYTES NFR BLD AUTO: 8.1 % (ref 19.6–45.3)
LYMPHOCYTES NFR BLD AUTO: 9.6 % (ref 19.6–45.3)
LYMPHOCYTES NFR BLD MANUAL: 6 % (ref 5–12)
Lab: ABNORMAL
MACROCYTES BLD QL SMEAR: ABNORMAL
MAGNESIUM SERPL-MCNC: 1.9 MG/DL (ref 1.6–2.6)
MCH RBC QN AUTO: 24.7 PG (ref 26.6–33)
MCH RBC QN AUTO: 24.7 PG (ref 26.6–33)
MCH RBC QN AUTO: 24.9 PG (ref 26.6–33)
MCH RBC QN AUTO: 25.6 PG (ref 26.6–33)
MCH RBC QN AUTO: 26.9 PG (ref 26.6–33)
MCHC RBC AUTO-ENTMCNC: 24.5 G/DL (ref 31.5–35.7)
MCHC RBC AUTO-ENTMCNC: 30.2 G/DL (ref 31.5–35.7)
MCHC RBC AUTO-ENTMCNC: 30.4 G/DL (ref 31.5–35.7)
MCHC RBC AUTO-ENTMCNC: 30.6 G/DL (ref 31.5–35.7)
MCHC RBC AUTO-ENTMCNC: 31.2 G/DL (ref 31.5–35.7)
MCV RBC AUTO: 101.5 FL (ref 79–97)
MCV RBC AUTO: 80.7 FL (ref 79–97)
MCV RBC AUTO: 81.8 FL (ref 79–97)
MCV RBC AUTO: 84.3 FL (ref 79–97)
MCV RBC AUTO: 86.3 FL (ref 79–97)
METAMYELOCYTES NFR BLD MANUAL: 6 % (ref 0–0)
METHGB BLD QL: 0.7 % (ref 0–3)
METHGB BLD QL: <-0.1 % (ref 0–3)
MICROCYTES BLD QL: ABNORMAL
MODALITY: ABNORMAL
MODALITY: ABNORMAL
MONOCYTES # BLD AUTO: 0.87 10*3/MM3 (ref 0.1–0.9)
MONOCYTES # BLD AUTO: 0.99 10*3/MM3 (ref 0.1–0.9)
MONOCYTES # BLD AUTO: 1.34 10*3/MM3 (ref 0.1–0.9)
MONOCYTES # BLD: 0.69 10*3/MM3 (ref 0.1–0.9)
MONOCYTES NFR BLD AUTO: 4 % (ref 5–12)
MONOCYTES NFR BLD AUTO: 4.2 % (ref 5–12)
MONOCYTES NFR BLD AUTO: 5.1 % (ref 5–12)
NEUTROPHILS # BLD AUTO: 5.17 10*3/MM3 (ref 1.7–7)
NEUTROPHILS # BLD AUTO: 8.5 10*3/MM3 (ref 1.7–7)
NEUTROPHILS NFR BLD AUTO: 13.76 10*3/MM3 (ref 1.7–7)
NEUTROPHILS NFR BLD AUTO: 21.3 10*3/MM3 (ref 1.7–7)
NEUTROPHILS NFR BLD AUTO: 27.53 10*3/MM3 (ref 1.7–7)
NEUTROPHILS NFR BLD AUTO: 80.8 % (ref 42.7–76)
NEUTROPHILS NFR BLD AUTO: 85 % (ref 42.7–76)
NEUTROPHILS NFR BLD AUTO: 85.7 % (ref 42.7–76)
NEUTROPHILS NFR BLD MANUAL: 68 % (ref 42.7–76)
NEUTROPHILS NFR BLD MANUAL: 83 % (ref 42.7–76)
NEUTS BAND NFR BLD MANUAL: 2 % (ref 0–5)
NEUTS BAND NFR BLD MANUAL: 6 % (ref 0–5)
NEUTS VAC BLD QL SMEAR: ABNORMAL
NITRITE UR QL STRIP: NEGATIVE
NOTE: ABNORMAL
NOTE: ABNORMAL
NOTIFIED BY: ABNORMAL
NOTIFIED BY: ABNORMAL
NOTIFIED WHO: ABNORMAL
NOTIFIED WHO: ABNORMAL
NRBC BLD AUTO-RTO: 0 /100 WBC (ref 0–0.2)
NT-PROBNP SERPL-MCNC: 6677 PG/ML (ref 0–900)
OXYHGB MFR BLDV: 46.6 % (ref 94–99)
OXYHGB MFR BLDV: 58.6 % (ref 94–99)
PCO2 BLDA: 34.1 MM HG (ref 35–45)
PCO2 BLDA: 73.4 MM HG (ref 35–45)
PCO2 TEMP ADJ BLD: ABNORMAL MM[HG]
PCO2 TEMP ADJ BLD: ABNORMAL MM[HG]
PH BLDA: 7.1 PH UNITS (ref 7.35–7.45)
PH BLDA: <6.779 PH UNITS (ref 7.35–7.45)
PH UR STRIP.AUTO: 5.5 [PH] (ref 5–8)
PH, TEMP CORRECTED: ABNORMAL
PH, TEMP CORRECTED: ABNORMAL
PHOSPHATE SERPL-MCNC: 2.7 MG/DL (ref 2.5–4.5)
PLATELET # BLD AUTO: 218 10*3/MM3 (ref 140–450)
PLATELET # BLD AUTO: 33 10*3/MM3 (ref 140–450)
PLATELET # BLD AUTO: 382 10*3/MM3 (ref 140–450)
PLATELET # BLD AUTO: 387 10*3/MM3 (ref 140–450)
PLATELET # BLD AUTO: 419 10*3/MM3 (ref 140–450)
PMV BLD AUTO: 10.5 FL (ref 6–12)
PMV BLD AUTO: 10.5 FL (ref 6–12)
PMV BLD AUTO: 11.1 FL (ref 6–12)
PMV BLD AUTO: 11.8 FL (ref 6–12)
PMV BLD AUTO: 13.3 FL (ref 6–12)
PO2 BLDA: 48.5 MM HG (ref 83–108)
PO2 BLDA: 57.4 MM HG (ref 83–108)
PO2 TEMP ADJ BLD: ABNORMAL MM[HG]
PO2 TEMP ADJ BLD: ABNORMAL MM[HG]
POTASSIUM SERPL-SCNC: 3.2 MMOL/L (ref 3.5–5.2)
POTASSIUM SERPL-SCNC: 3.3 MMOL/L (ref 3.5–5.2)
POTASSIUM SERPL-SCNC: 3.4 MMOL/L (ref 3.5–5.2)
POTASSIUM SERPL-SCNC: 3.4 MMOL/L (ref 3.5–5.2)
POTASSIUM SERPL-SCNC: 5.2 MMOL/L (ref 3.5–5.2)
PROT SERPL-MCNC: 6 G/DL (ref 6–8.5)
PROT SERPL-MCNC: 7.4 G/DL (ref 6–8.5)
PROT SERPL-MCNC: 7.4 G/DL (ref 6–8.5)
PROT SERPL-MCNC: 7.9 G/DL (ref 6–8.5)
PROT SERPL-MCNC: 8.3 G/DL (ref 6–8.5)
PROT UR QL STRIP: ABNORMAL
RBC # BLD AUTO: 1.97 10*6/MM3 (ref 4.14–5.8)
RBC # BLD AUTO: 3.12 10*6/MM3 (ref 4.14–5.8)
RBC # BLD AUTO: 3.36 10*6/MM3 (ref 4.14–5.8)
RBC # BLD AUTO: 3.68 10*6/MM3 (ref 4.14–5.8)
RBC # BLD AUTO: 3.94 10*6/MM3 (ref 4.14–5.8)
RBC # UR STRIP: ABNORMAL /HPF
REF LAB TEST METHOD: ABNORMAL
RH BLD: POSITIVE
RH BLD: POSITIVE
SAO2 % BLDCOA: 47.6 % (ref 94–99)
SAO2 % BLDCOA: 59.2 % (ref 94–99)
SARS-COV-2 RNA PNL SPEC NAA+PROBE: NOT DETECTED
SARS-COV-2 RNA RESP QL NAA+PROBE: NOT DETECTED
SCAN SLIDE: NORMAL
SET MECH RESP RATE: 26
SMALL PLATELETS BLD QL SMEAR: ABNORMAL
SODIUM SERPL-SCNC: 129 MMOL/L (ref 136–145)
SODIUM SERPL-SCNC: 130 MMOL/L (ref 136–145)
SODIUM SERPL-SCNC: 133 MMOL/L (ref 136–145)
SODIUM SERPL-SCNC: 137 MMOL/L (ref 136–145)
SODIUM SERPL-SCNC: 145 MMOL/L (ref 136–145)
SP GR UR STRIP: 1.02 (ref 1–1.03)
SQUAMOUS #/AREA URNS HPF: ABNORMAL /HPF
T&S EXPIRATION DATE: NORMAL
TIBC SERPL-MCNC: 253 MCG/DL (ref 298–536)
TOTAL RATE: 40 BREATHS/MINUTE
TOXIC GRANULATION: ABNORMAL
TOXIC GRANULATION: ABNORMAL
TRANSFERRIN SERPL-MCNC: 170 MG/DL (ref 200–360)
TROPONIN T SERPL-MCNC: <0.01 NG/ML (ref 0–0.03)
TROPONIN T SERPL-MCNC: <0.01 NG/ML (ref 0–0.03)
UROBILINOGEN UR QL STRIP: ABNORMAL
VARIANT LYMPHS NFR BLD MANUAL: 20 % (ref 19.6–45.3)
VARIANT LYMPHS NFR BLD MANUAL: 9 % (ref 19.6–45.3)
VENTILATOR MODE: ABNORMAL
VENTILATOR MODE: ABNORMAL
VIT B12 BLD-MCNC: 442 PG/ML (ref 211–946)
WBC # UR STRIP: ABNORMAL /HPF
WBC NRBC COR # BLD: 11.48 10*3/MM3 (ref 3.4–10.8)
WBC NRBC COR # BLD: 17.03 10*3/MM3 (ref 3.4–10.8)
WBC NRBC COR # BLD: 25.05 10*3/MM3 (ref 3.4–10.8)
WBC NRBC COR # BLD: 32.09 10*3/MM3 (ref 3.4–10.8)
WBC NRBC COR # BLD: 6.08 10*3/MM3 (ref 3.4–10.8)

## 2022-01-01 PROCEDURE — 25010000002 HEPARIN LOCK FLUSH PER 10 UNITS

## 2022-01-01 PROCEDURE — 87205 SMEAR GRAM STAIN: CPT | Performed by: NURSE PRACTITIONER

## 2022-01-01 PROCEDURE — 96413 CHEMO IV INFUSION 1 HR: CPT

## 2022-01-01 PROCEDURE — 25010000002 FENTANYL CITRATE (PF) 50 MCG/ML SOLUTION: Performed by: NURSE ANESTHETIST, CERTIFIED REGISTERED

## 2022-01-01 PROCEDURE — G0463 HOSPITAL OUTPT CLINIC VISIT: HCPCS

## 2022-01-01 PROCEDURE — 25010000002 PHENYLEPHRINE 10 MG/ML SOLUTION: Performed by: NURSE ANESTHETIST, CERTIFIED REGISTERED

## 2022-01-01 PROCEDURE — G0432 EIA HIV-1/HIV-2 SCREEN: HCPCS | Performed by: INTERNAL MEDICINE

## 2022-01-01 PROCEDURE — 96372 THER/PROPH/DIAG INJ SC/IM: CPT

## 2022-01-01 PROCEDURE — 0 FLUDEOXYGLUCOSE F18 SOLUTION: Performed by: INTERNAL MEDICINE

## 2022-01-01 PROCEDURE — 96417 CHEMO IV INFUS EACH ADDL SEQ: CPT

## 2022-01-01 PROCEDURE — 85025 COMPLETE CBC W/AUTO DIFF WBC: CPT | Performed by: EMERGENCY MEDICINE

## 2022-01-01 PROCEDURE — 25010000002 HEPARIN LOCK FLUSH PER 10 UNITS: Performed by: NURSE PRACTITIONER

## 2022-01-01 PROCEDURE — 96374 THER/PROPH/DIAG INJ IV PUSH: CPT

## 2022-01-01 PROCEDURE — 84466 ASSAY OF TRANSFERRIN: CPT | Performed by: INTERNAL MEDICINE

## 2022-01-01 PROCEDURE — 96415 CHEMO IV INFUSION ADDL HR: CPT

## 2022-01-01 PROCEDURE — 87077 CULTURE AEROBIC IDENTIFY: CPT | Performed by: NURSE PRACTITIONER

## 2022-01-01 PROCEDURE — 36561 INSERT TUNNELED CV CATH: CPT | Performed by: SURGERY

## 2022-01-01 PROCEDURE — A9552 F18 FDG: HCPCS | Performed by: INTERNAL MEDICINE

## 2022-01-01 PROCEDURE — 87186 SC STD MICRODIL/AGAR DIL: CPT | Performed by: NURSE PRACTITIONER

## 2022-01-01 PROCEDURE — 82375 ASSAY CARBOXYHB QUANT: CPT

## 2022-01-01 PROCEDURE — 25010000002 CARBOPLATIN PER 50 MG: Performed by: INTERNAL MEDICINE

## 2022-01-01 PROCEDURE — 93005 ELECTROCARDIOGRAM TRACING: CPT | Performed by: EMERGENCY MEDICINE

## 2022-01-01 PROCEDURE — 85025 COMPLETE CBC W/AUTO DIFF WBC: CPT | Performed by: NURSE PRACTITIONER

## 2022-01-01 PROCEDURE — 83605 ASSAY OF LACTIC ACID: CPT | Performed by: EMERGENCY MEDICINE

## 2022-01-01 PROCEDURE — 25010000002 CEFTRIAXONE PER 250 MG: Performed by: NURSE PRACTITIONER

## 2022-01-01 PROCEDURE — 36415 COLL VENOUS BLD VENIPUNCTURE: CPT

## 2022-01-01 PROCEDURE — 90670 PCV13 VACCINE IM: CPT | Performed by: INTERNAL MEDICINE

## 2022-01-01 PROCEDURE — C1788 PORT, INDWELLING, IMP: HCPCS | Performed by: SURGERY

## 2022-01-01 PROCEDURE — 25010000002 CEFTRIAXONE PER 250 MG: Performed by: EMERGENCY MEDICINE

## 2022-01-01 PROCEDURE — 93010 ELECTROCARDIOGRAM REPORT: CPT | Performed by: INTERNAL MEDICINE

## 2022-01-01 PROCEDURE — 85025 COMPLETE CBC W/AUTO DIFF WBC: CPT

## 2022-01-01 PROCEDURE — 97602 WOUND(S) CARE NON-SELECTIVE: CPT

## 2022-01-01 PROCEDURE — 83880 ASSAY OF NATRIURETIC PEPTIDE: CPT | Performed by: EMERGENCY MEDICINE

## 2022-01-01 PROCEDURE — 86900 BLOOD TYPING SEROLOGIC ABO: CPT | Performed by: EMERGENCY MEDICINE

## 2022-01-01 PROCEDURE — 96367 TX/PROPH/DG ADDL SEQ IV INF: CPT

## 2022-01-01 PROCEDURE — 99215 OFFICE O/P EST HI 40 MIN: CPT | Performed by: INTERNAL MEDICINE

## 2022-01-01 PROCEDURE — 25010000002 MIDAZOLAM PER 1 MG: Performed by: NURSE ANESTHETIST, CERTIFIED REGISTERED

## 2022-01-01 PROCEDURE — 85007 BL SMEAR W/DIFF WBC COUNT: CPT | Performed by: EMERGENCY MEDICINE

## 2022-01-01 PROCEDURE — 86901 BLOOD TYPING SEROLOGIC RH(D): CPT

## 2022-01-01 PROCEDURE — 82962 GLUCOSE BLOOD TEST: CPT

## 2022-01-01 PROCEDURE — 25010000002 FOSAPREPITANT PER 1 MG: Performed by: INTERNAL MEDICINE

## 2022-01-01 PROCEDURE — 25010000002 PACLITAXEL PER 1 MG: Performed by: INTERNAL MEDICINE

## 2022-01-01 PROCEDURE — 96375 TX/PRO/DX INJ NEW DRUG ADDON: CPT

## 2022-01-01 PROCEDURE — 25010000002 PACLITAXEL PER 1 MG: Performed by: NURSE PRACTITIONER

## 2022-01-01 PROCEDURE — 71045 X-RAY EXAM CHEST 1 VIEW: CPT

## 2022-01-01 PROCEDURE — 99204 OFFICE O/P NEW MOD 45 MIN: CPT | Performed by: RADIOLOGY

## 2022-01-01 PROCEDURE — 86900 BLOOD TYPING SEROLOGIC ABO: CPT

## 2022-01-01 PROCEDURE — 80053 COMPREHEN METABOLIC PANEL: CPT | Performed by: INTERNAL MEDICINE

## 2022-01-01 PROCEDURE — 99215 OFFICE O/P EST HI 40 MIN: CPT | Performed by: NURSE PRACTITIONER

## 2022-01-01 PROCEDURE — 81001 URINALYSIS AUTO W/SCOPE: CPT | Performed by: EMERGENCY MEDICINE

## 2022-01-01 PROCEDURE — 25010000002 DIPHENHYDRAMINE PER 50 MG: Performed by: INTERNAL MEDICINE

## 2022-01-01 PROCEDURE — 87070 CULTURE OTHR SPECIMN AEROBIC: CPT | Performed by: NURSE PRACTITIONER

## 2022-01-01 PROCEDURE — 25010000002 ALTEPLASE 2 MG RECONSTITUTED SOLUTION: Performed by: NURSE PRACTITIONER

## 2022-01-01 PROCEDURE — 0 LIDOCAINE 1 % SOLUTION: Performed by: SURGERY

## 2022-01-01 PROCEDURE — 94799 UNLISTED PULMONARY SVC/PX: CPT

## 2022-01-01 PROCEDURE — 90471 IMMUNIZATION ADMIN: CPT | Performed by: INTERNAL MEDICINE

## 2022-01-01 PROCEDURE — 80053 COMPREHEN METABOLIC PANEL: CPT | Performed by: NURSE PRACTITIONER

## 2022-01-01 PROCEDURE — 99203 OFFICE O/P NEW LOW 30 MIN: CPT | Performed by: SURGERY

## 2022-01-01 PROCEDURE — 86901 BLOOD TYPING SEROLOGIC RH(D): CPT | Performed by: EMERGENCY MEDICINE

## 2022-01-01 PROCEDURE — 94660 CPAP INITIATION&MGMT: CPT

## 2022-01-01 PROCEDURE — 85007 BL SMEAR W/DIFF WBC COUNT: CPT | Performed by: NURSE PRACTITIONER

## 2022-01-01 PROCEDURE — 71045 X-RAY EXAM CHEST 1 VIEW: CPT | Performed by: RADIOLOGY

## 2022-01-01 PROCEDURE — 85379 FIBRIN DEGRADATION QUANT: CPT | Performed by: EMERGENCY MEDICINE

## 2022-01-01 PROCEDURE — 78815 PET IMAGE W/CT SKULL-THIGH: CPT

## 2022-01-01 PROCEDURE — 82728 ASSAY OF FERRITIN: CPT | Performed by: INTERNAL MEDICINE

## 2022-01-01 PROCEDURE — 78815 PET IMAGE W/CT SKULL-THIGH: CPT | Performed by: RADIOLOGY

## 2022-01-01 PROCEDURE — 83050 HGB METHEMOGLOBIN QUAN: CPT

## 2022-01-01 PROCEDURE — 82805 BLOOD GASES W/O2 SATURATION: CPT

## 2022-01-01 PROCEDURE — 25010000002 DEXAMETHASONE SODIUM PHOSPHATE 20 MG/5ML SOLUTION: Performed by: INTERNAL MEDICINE

## 2022-01-01 PROCEDURE — 99285 EMERGENCY DEPT VISIT HI MDM: CPT

## 2022-01-01 PROCEDURE — 25010000002 PROPOFOL 10 MG/ML EMULSION: Performed by: NURSE ANESTHETIST, CERTIFIED REGISTERED

## 2022-01-01 PROCEDURE — 80053 COMPREHEN METABOLIC PANEL: CPT | Performed by: EMERGENCY MEDICINE

## 2022-01-01 PROCEDURE — 36593 DECLOT VASCULAR DEVICE: CPT

## 2022-01-01 PROCEDURE — 87636 SARSCOV2 & INF A&B AMP PRB: CPT | Performed by: SURGERY

## 2022-01-01 PROCEDURE — 36600 WITHDRAWAL OF ARTERIAL BLOOD: CPT

## 2022-01-01 PROCEDURE — 25010000002 FOSAPREPITANT PER 1 MG: Performed by: NURSE PRACTITIONER

## 2022-01-01 PROCEDURE — 99205 OFFICE O/P NEW HI 60 MIN: CPT | Performed by: INTERNAL MEDICINE

## 2022-01-01 PROCEDURE — 25010000002 DEXAMETHASONE SODIUM PHOSPHATE 20 MG/5ML SOLUTION: Performed by: NURSE PRACTITIONER

## 2022-01-01 PROCEDURE — 25010000002 AZITHROMYCIN PER 500 MG: Performed by: EMERGENCY MEDICINE

## 2022-01-01 PROCEDURE — 25010000002 MORPHINE SULFATE-NACL 30-0.9 MG/30ML-% SOLUTION PREFILLED SYRINGE: Performed by: EMERGENCY MEDICINE

## 2022-01-01 PROCEDURE — 99214 OFFICE O/P EST MOD 30 MIN: CPT | Performed by: NURSE PRACTITIONER

## 2022-01-01 PROCEDURE — 76000 FLUOROSCOPY <1 HR PHYS/QHP: CPT | Performed by: RADIOLOGY

## 2022-01-01 PROCEDURE — 25010000002 HEPARIN LOCK FLUSH PER 10 UNITS: Performed by: SURGERY

## 2022-01-01 PROCEDURE — 25010000002 PALONOSETRON 0.25 MG/5ML SOLUTION PREFILLED SYRINGE: Performed by: NURSE PRACTITIONER

## 2022-01-01 PROCEDURE — 25010000002 CEFTRIAXONE IN SWFI 1 GRAM/10ML IV PUSH SYRINGE (SIMPLE): Performed by: NURSE PRACTITIONER

## 2022-01-01 PROCEDURE — 25010000002 PEGFILGRASTIM-CBQV 6 MG/0.6ML SOLUTION PREFILLED SYRINGE: Performed by: NURSE PRACTITIONER

## 2022-01-01 PROCEDURE — 99236 HOSP IP/OBS SAME DATE HI 85: CPT | Performed by: INTERNAL MEDICINE

## 2022-01-01 PROCEDURE — 90686 IIV4 VACC NO PRSV 0.5 ML IM: CPT | Performed by: INTERNAL MEDICINE

## 2022-01-01 PROCEDURE — 80053 COMPREHEN METABOLIC PANEL: CPT

## 2022-01-01 PROCEDURE — 25010000002 CARBOPLATIN PER 50 MG: Performed by: NURSE PRACTITIONER

## 2022-01-01 PROCEDURE — 83540 ASSAY OF IRON: CPT | Performed by: INTERNAL MEDICINE

## 2022-01-01 PROCEDURE — 76000 FLUOROSCOPY <1 HR PHYS/QHP: CPT

## 2022-01-01 PROCEDURE — 82746 ASSAY OF FOLIC ACID SERUM: CPT | Performed by: INTERNAL MEDICINE

## 2022-01-01 PROCEDURE — 25010000002 PALONOSETRON 0.25 MG/5ML SOLUTION PREFILLED SYRINGE: Performed by: INTERNAL MEDICINE

## 2022-01-01 PROCEDURE — 84100 ASSAY OF PHOSPHORUS: CPT

## 2022-01-01 PROCEDURE — 87040 BLOOD CULTURE FOR BACTERIA: CPT | Performed by: EMERGENCY MEDICINE

## 2022-01-01 PROCEDURE — 96365 THER/PROPH/DIAG IV INF INIT: CPT

## 2022-01-01 PROCEDURE — 90472 IMMUNIZATION ADMIN EACH ADD: CPT | Performed by: INTERNAL MEDICINE

## 2022-01-01 PROCEDURE — 99212 OFFICE O/P EST SF 10 MIN: CPT | Performed by: SURGERY

## 2022-01-01 PROCEDURE — 84484 ASSAY OF TROPONIN QUANT: CPT | Performed by: EMERGENCY MEDICINE

## 2022-01-01 PROCEDURE — 25010000002 ONDANSETRON PER 1 MG: Performed by: NURSE ANESTHETIST, CERTIFIED REGISTERED

## 2022-01-01 PROCEDURE — 83735 ASSAY OF MAGNESIUM: CPT

## 2022-01-01 PROCEDURE — 82607 VITAMIN B-12: CPT | Performed by: INTERNAL MEDICINE

## 2022-01-01 PROCEDURE — 87636 SARSCOV2 & INF A&B AMP PRB: CPT | Performed by: EMERGENCY MEDICINE

## 2022-01-01 PROCEDURE — 86850 RBC ANTIBODY SCREEN: CPT | Performed by: EMERGENCY MEDICINE

## 2022-01-01 PROCEDURE — 85025 COMPLETE CBC W/AUTO DIFF WBC: CPT | Performed by: INTERNAL MEDICINE

## 2022-01-01 PROCEDURE — 25010000002 DIPHENHYDRAMINE PER 50 MG: Performed by: NURSE PRACTITIONER

## 2022-01-01 DEVICE — PRT INTRO VASC/INTERV VORTEX FILL/HL DETACH/POLYURET/CATH 8F: Type: IMPLANTABLE DEVICE | Site: CHEST | Status: FUNCTIONAL

## 2022-01-01 RX ORDER — ONDANSETRON HYDROCHLORIDE 8 MG/1
8 TABLET, FILM COATED ORAL 3 TIMES DAILY PRN
Qty: 30 TABLET | Refills: 5 | Status: SHIPPED | OUTPATIENT
Start: 2022-01-01 | End: 2022-01-01 | Stop reason: SDUPTHER

## 2022-01-01 RX ORDER — SODIUM CHLORIDE 0.9 % (FLUSH) 0.9 %
10 SYRINGE (ML) INJECTION AS NEEDED
Status: CANCELLED | OUTPATIENT
Start: 2022-01-01

## 2022-01-01 RX ORDER — HEPARIN SODIUM (PORCINE) LOCK FLUSH IV SOLN 100 UNIT/ML 100 UNIT/ML
SOLUTION INTRAVENOUS
Status: DISPENSED
Start: 2022-01-01

## 2022-01-01 RX ORDER — SULFAMETHOXAZOLE AND TRIMETHOPRIM 800; 160 MG/1; MG/1
1 TABLET ORAL 2 TIMES DAILY
Qty: 20 TABLET | Refills: 0 | Status: SHIPPED | OUTPATIENT
Start: 2022-01-01 | End: 2022-01-01

## 2022-01-01 RX ORDER — OXYCODONE HYDROCHLORIDE 10 MG/1
TABLET ORAL
Qty: 200 TABLET | Refills: 0 | Status: SHIPPED | OUTPATIENT
Start: 2022-01-01 | End: 2022-01-01 | Stop reason: SDUPTHER

## 2022-01-01 RX ORDER — SODIUM CHLORIDE 0.9 % (FLUSH) 0.9 %
10 SYRINGE (ML) INJECTION AS NEEDED
Status: DISCONTINUED | OUTPATIENT
Start: 2022-01-01 | End: 2022-01-01 | Stop reason: HOSPADM

## 2022-01-01 RX ORDER — MORPHINE SULFATE/0.9% NACL/PF 1 MG/ML
SYRINGE (ML) INJECTION CONTINUOUS
Status: DISCONTINUED | OUTPATIENT
Start: 2022-01-01 | End: 2022-01-01

## 2022-01-01 RX ORDER — FAMOTIDINE 10 MG/ML
20 INJECTION, SOLUTION INTRAVENOUS AS NEEDED
Status: CANCELLED | OUTPATIENT
Start: 2022-01-01

## 2022-01-01 RX ORDER — HEPARIN SODIUM (PORCINE) LOCK FLUSH IV SOLN 100 UNIT/ML 100 UNIT/ML
500 SOLUTION INTRAVENOUS AS NEEDED
Status: CANCELLED | OUTPATIENT
Start: 2022-01-01

## 2022-01-01 RX ORDER — SODIUM CHLORIDE 0.9 % (FLUSH) 0.9 %
10 SYRINGE (ML) INJECTION EVERY 12 HOURS SCHEDULED
Status: DISCONTINUED | OUTPATIENT
Start: 2022-01-01 | End: 2022-01-01 | Stop reason: HOSPADM

## 2022-01-01 RX ORDER — PROPOFOL 10 MG/ML
VIAL (ML) INTRAVENOUS AS NEEDED
Status: DISCONTINUED | OUTPATIENT
Start: 2022-01-01 | End: 2022-01-01 | Stop reason: SURG

## 2022-01-01 RX ORDER — MIDAZOLAM HYDROCHLORIDE 1 MG/ML
INJECTION INTRAMUSCULAR; INTRAVENOUS AS NEEDED
Status: DISCONTINUED | OUTPATIENT
Start: 2022-01-01 | End: 2022-01-01 | Stop reason: SURG

## 2022-01-01 RX ORDER — FENTANYL CITRATE 50 UG/ML
50 INJECTION, SOLUTION INTRAMUSCULAR; INTRAVENOUS
Status: DISCONTINUED | OUTPATIENT
Start: 2022-01-01 | End: 2022-01-01 | Stop reason: HOSPADM

## 2022-01-01 RX ORDER — HYDROCODONE BITARTRATE AND ACETAMINOPHEN 5; 325 MG/1; MG/1
TABLET ORAL
Qty: 15 TABLET | Refills: 0 | Status: ON HOLD | OUTPATIENT
Start: 2022-01-01 | End: 2022-01-01

## 2022-01-01 RX ORDER — FAMOTIDINE 10 MG/ML
20 INJECTION, SOLUTION INTRAVENOUS ONCE
Status: COMPLETED | OUTPATIENT
Start: 2022-01-01 | End: 2022-01-01

## 2022-01-01 RX ORDER — PROCHLORPERAZINE MALEATE 10 MG
10 TABLET ORAL EVERY 6 HOURS PRN
Qty: 60 TABLET | Refills: 3 | Status: CANCELLED | OUTPATIENT
Start: 2022-01-01

## 2022-01-01 RX ORDER — HEPARIN SODIUM (PORCINE) LOCK FLUSH IV SOLN 100 UNIT/ML 100 UNIT/ML
SOLUTION INTRAVENOUS
Status: COMPLETED
Start: 2022-01-01 | End: 2022-01-01

## 2022-01-01 RX ORDER — AMOXICILLIN 250 MG
2 CAPSULE ORAL DAILY
Qty: 60 TABLET | Refills: 5 | Status: SHIPPED | OUTPATIENT
Start: 2022-01-01

## 2022-01-01 RX ORDER — HEPARIN SODIUM (PORCINE) LOCK FLUSH IV SOLN 100 UNIT/ML 100 UNIT/ML
300 SOLUTION INTRAVENOUS ONCE
Status: CANCELLED | OUTPATIENT
Start: 2022-01-01

## 2022-01-01 RX ORDER — MORPHINE SULFATE 30 MG/1
30 TABLET, FILM COATED, EXTENDED RELEASE ORAL EVERY 8 HOURS
Qty: 90 TABLET | Refills: 0 | Status: SHIPPED | OUTPATIENT
Start: 2022-01-01 | End: 2022-01-01 | Stop reason: DRUGHIGH

## 2022-01-01 RX ORDER — OLANZAPINE 5 MG/1
5 TABLET ORAL NIGHTLY
Qty: 3 TABLET | Refills: 5 | Status: SHIPPED | OUTPATIENT
Start: 2022-01-01

## 2022-01-01 RX ORDER — SODIUM CHLORIDE 0.9 % (FLUSH) 0.9 %
20 SYRINGE (ML) INJECTION AS NEEDED
Status: CANCELLED | OUTPATIENT
Start: 2022-01-01

## 2022-01-01 RX ORDER — IRON POLYSACCHARIDE COMPLEX 150 MG
150 CAPSULE ORAL DAILY
Qty: 30 CAPSULE | Refills: 3 | Status: CANCELLED | OUTPATIENT
Start: 2022-01-01

## 2022-01-01 RX ORDER — MORPHINE SULFATE 60 MG/1
60 TABLET, FILM COATED, EXTENDED RELEASE ORAL EVERY 8 HOURS SCHEDULED
Qty: 90 TABLET | Refills: 0 | Status: SHIPPED | OUTPATIENT
Start: 2022-01-01

## 2022-01-01 RX ORDER — OXYCODONE HYDROCHLORIDE 5 MG/1
TABLET ORAL
Qty: 200 TABLET | Refills: 0 | Status: SHIPPED | OUTPATIENT
Start: 2022-01-01 | End: 2022-01-01 | Stop reason: SDUPTHER

## 2022-01-01 RX ORDER — CETIRIZINE HYDROCHLORIDE 10 MG/1
1 TABLET ORAL DAILY
COMMUNITY
Start: 2021-01-01

## 2022-01-01 RX ORDER — PALONOSETRON 0.05 MG/ML
0.25 INJECTION, SOLUTION INTRAVENOUS ONCE
Status: COMPLETED | OUTPATIENT
Start: 2022-01-01 | End: 2022-01-01

## 2022-01-01 RX ORDER — DRONABINOL 2.5 MG/1
2.5 CAPSULE ORAL
Qty: 60 CAPSULE | Refills: 0 | Status: CANCELLED | OUTPATIENT
Start: 2022-01-01

## 2022-01-01 RX ORDER — SODIUM CHLORIDE 0.9 % (FLUSH) 0.9 %
20 SYRINGE (ML) INJECTION AS NEEDED
Status: DISCONTINUED | OUTPATIENT
Start: 2022-01-01 | End: 2022-01-01 | Stop reason: HOSPADM

## 2022-01-01 RX ORDER — HEPARIN SODIUM (PORCINE) LOCK FLUSH IV SOLN 100 UNIT/ML 100 UNIT/ML
5 SOLUTION INTRAVENOUS AS NEEDED
Status: DISCONTINUED | OUTPATIENT
Start: 2022-01-01 | End: 2022-01-01 | Stop reason: HOSPADM

## 2022-01-01 RX ORDER — OXYCODONE HYDROCHLORIDE 10 MG/1
TABLET ORAL
Qty: 200 TABLET | Refills: 0 | Status: SHIPPED | OUTPATIENT
Start: 2022-01-01

## 2022-01-01 RX ORDER — KETOROLAC TROMETHAMINE 30 MG/ML
30 INJECTION, SOLUTION INTRAMUSCULAR; INTRAVENOUS EVERY 6 HOURS PRN
Status: DISCONTINUED | OUTPATIENT
Start: 2022-01-01 | End: 2022-01-01 | Stop reason: HOSPADM

## 2022-01-01 RX ORDER — OLANZAPINE 5 MG/1
5 TABLET ORAL ONCE
Status: CANCELLED | OUTPATIENT
Start: 2022-01-01 | End: 2022-01-01

## 2022-01-01 RX ORDER — ONDANSETRON HYDROCHLORIDE 8 MG/1
8 TABLET, FILM COATED ORAL 3 TIMES DAILY PRN
Qty: 30 TABLET | Refills: 5 | Status: SHIPPED | OUTPATIENT
Start: 2022-01-01

## 2022-01-01 RX ORDER — OXYCODONE HYDROCHLORIDE 10 MG/1
TABLET ORAL
Qty: 36 TABLET | Refills: 0 | Status: SHIPPED | OUTPATIENT
Start: 2022-01-01 | End: 2022-01-01 | Stop reason: DRUGHIGH

## 2022-01-01 RX ORDER — OXYCODONE HYDROCHLORIDE AND ACETAMINOPHEN 5; 325 MG/1; MG/1
1 TABLET ORAL ONCE AS NEEDED
Status: DISCONTINUED | OUTPATIENT
Start: 2022-01-01 | End: 2022-01-01 | Stop reason: HOSPADM

## 2022-01-01 RX ORDER — MORPHINE SULFATE 15 MG/1
60 TABLET, FILM COATED, EXTENDED RELEASE ORAL EVERY 8 HOURS SCHEDULED
Qty: 90 TABLET | Refills: 0 | Status: SHIPPED | OUTPATIENT
Start: 2022-01-01 | End: 2022-01-01 | Stop reason: DRUGHIGH

## 2022-01-01 RX ORDER — OXYCODONE HYDROCHLORIDE 5 MG/1
TABLET ORAL
Qty: 200 TABLET | Refills: 0 | Status: CANCELLED | OUTPATIENT
Start: 2022-01-01

## 2022-01-01 RX ORDER — OXYCODONE HYDROCHLORIDE 5 MG/1
TABLET ORAL
Qty: 200 TABLET | Refills: 0 | Status: SHIPPED | OUTPATIENT
Start: 2022-01-01 | End: 2022-01-01 | Stop reason: DRUGHIGH

## 2022-01-01 RX ORDER — FENTANYL CITRATE 50 UG/ML
INJECTION, SOLUTION INTRAMUSCULAR; INTRAVENOUS AS NEEDED
Status: DISCONTINUED | OUTPATIENT
Start: 2022-01-01 | End: 2022-01-01 | Stop reason: SURG

## 2022-01-01 RX ORDER — ONDANSETRON 2 MG/ML
4 INJECTION INTRAMUSCULAR; INTRAVENOUS AS NEEDED
Status: DISCONTINUED | OUTPATIENT
Start: 2022-01-01 | End: 2022-01-01 | Stop reason: HOSPADM

## 2022-01-01 RX ORDER — IPRATROPIUM BROMIDE AND ALBUTEROL SULFATE 2.5; .5 MG/3ML; MG/3ML
3 SOLUTION RESPIRATORY (INHALATION) ONCE AS NEEDED
Status: DISCONTINUED | OUTPATIENT
Start: 2022-01-01 | End: 2022-01-01 | Stop reason: HOSPADM

## 2022-01-01 RX ORDER — OLANZAPINE 5 MG/1
5 TABLET ORAL NIGHTLY
Qty: 3 TABLET | Refills: 5 | Status: SHIPPED | OUTPATIENT
Start: 2022-01-01 | End: 2022-01-01 | Stop reason: SDUPTHER

## 2022-01-01 RX ORDER — HEPARIN SODIUM (PORCINE) LOCK FLUSH IV SOLN 100 UNIT/ML 100 UNIT/ML
500 SOLUTION INTRAVENOUS AS NEEDED
Status: DISCONTINUED | OUTPATIENT
Start: 2022-01-01 | End: 2022-01-01 | Stop reason: HOSPADM

## 2022-01-01 RX ORDER — OLANZAPINE 5 MG/1
5 TABLET ORAL ONCE
Status: COMPLETED | OUTPATIENT
Start: 2022-01-01 | End: 2022-01-01

## 2022-01-01 RX ORDER — SODIUM CHLORIDE, SODIUM LACTATE, POTASSIUM CHLORIDE, CALCIUM CHLORIDE 600; 310; 30; 20 MG/100ML; MG/100ML; MG/100ML; MG/100ML
INJECTION, SOLUTION INTRAVENOUS CONTINUOUS PRN
Status: DISCONTINUED | OUTPATIENT
Start: 2022-01-01 | End: 2022-01-01 | Stop reason: SURG

## 2022-01-01 RX ORDER — MEPERIDINE HYDROCHLORIDE 25 MG/ML
12.5 INJECTION INTRAMUSCULAR; INTRAVENOUS; SUBCUTANEOUS
Status: DISCONTINUED | OUTPATIENT
Start: 2022-01-01 | End: 2022-01-01 | Stop reason: HOSPADM

## 2022-01-01 RX ORDER — DOXYCYCLINE HYCLATE 100 MG/1
100 CAPSULE ORAL 2 TIMES DAILY
Qty: 20 CAPSULE | Refills: 0 | Status: SHIPPED | OUTPATIENT
Start: 2022-01-01 | End: 2022-01-01

## 2022-01-01 RX ORDER — LIDOCAINE HYDROCHLORIDE 10 MG/ML
INJECTION, SOLUTION INFILTRATION; PERINEURAL AS NEEDED
Status: DISCONTINUED | OUTPATIENT
Start: 2022-01-01 | End: 2022-01-01 | Stop reason: HOSPADM

## 2022-01-01 RX ORDER — NALOXONE HYDROCHLORIDE 1 MG/ML
2 INJECTION INTRAMUSCULAR; INTRAVENOUS; SUBCUTANEOUS ONCE
Status: COMPLETED | OUTPATIENT
Start: 2022-01-01 | End: 2022-01-01

## 2022-01-01 RX ORDER — IRON POLYSACCHARIDE COMPLEX 150 MG
150 CAPSULE ORAL DAILY
Qty: 30 CAPSULE | Refills: 3 | Status: SHIPPED | OUTPATIENT
Start: 2022-01-01

## 2022-01-01 RX ORDER — HYDROCODONE BITARTRATE AND ACETAMINOPHEN 5; 325 MG/1; MG/1
1 TABLET ORAL EVERY 4 HOURS PRN
Status: DISCONTINUED | OUTPATIENT
Start: 2022-01-01 | End: 2022-01-01 | Stop reason: HOSPADM

## 2022-01-01 RX ORDER — HYDROMORPHONE HYDROCHLORIDE 1 MG/ML
0.5 INJECTION, SOLUTION INTRAMUSCULAR; INTRAVENOUS; SUBCUTANEOUS
Status: DISCONTINUED | OUTPATIENT
Start: 2022-01-01 | End: 2022-01-01 | Stop reason: HOSPADM

## 2022-01-01 RX ORDER — POTASSIUM CHLORIDE 20 MEQ/1
20 TABLET, EXTENDED RELEASE ORAL 2 TIMES DAILY
Qty: 6 TABLET | Refills: 0 | Status: SHIPPED | OUTPATIENT
Start: 2022-01-01 | End: 2022-01-01

## 2022-01-01 RX ORDER — SODIUM CHLORIDE 0.9 % (FLUSH) 0.9 %
3-10 SYRINGE (ML) INJECTION AS NEEDED
Status: DISCONTINUED | OUTPATIENT
Start: 2022-01-01 | End: 2022-01-01 | Stop reason: HOSPADM

## 2022-01-01 RX ORDER — DRONABINOL 2.5 MG/1
2.5 CAPSULE ORAL
Qty: 60 CAPSULE | Refills: 0 | Status: SHIPPED | OUTPATIENT
Start: 2022-01-01 | End: 2022-01-01 | Stop reason: SDUPTHER

## 2022-01-01 RX ORDER — ONDANSETRON HYDROCHLORIDE 8 MG/1
8 TABLET, FILM COATED ORAL 3 TIMES DAILY PRN
Qty: 30 TABLET | Refills: 5 | Status: CANCELLED | OUTPATIENT
Start: 2022-01-01

## 2022-01-01 RX ORDER — POTASSIUM CHLORIDE 20 MEQ/1
40 TABLET, EXTENDED RELEASE ORAL ONCE
Status: COMPLETED | OUTPATIENT
Start: 2022-01-01 | End: 2022-01-01

## 2022-01-01 RX ORDER — DIPHENHYDRAMINE HYDROCHLORIDE 50 MG/ML
50 INJECTION INTRAMUSCULAR; INTRAVENOUS AS NEEDED
Status: CANCELLED | OUTPATIENT
Start: 2022-01-01

## 2022-01-01 RX ORDER — SODIUM CHLORIDE, SODIUM LACTATE, POTASSIUM CHLORIDE, CALCIUM CHLORIDE 600; 310; 30; 20 MG/100ML; MG/100ML; MG/100ML; MG/100ML
100 INJECTION, SOLUTION INTRAVENOUS ONCE AS NEEDED
Status: DISCONTINUED | OUTPATIENT
Start: 2022-01-01 | End: 2022-01-01 | Stop reason: HOSPADM

## 2022-01-01 RX ORDER — DRONABINOL 2.5 MG/1
2.5 CAPSULE ORAL
Qty: 60 CAPSULE | Refills: 0 | Status: SHIPPED | OUTPATIENT
Start: 2022-01-01

## 2022-01-01 RX ORDER — FAMOTIDINE 10 MG/ML
INJECTION, SOLUTION INTRAVENOUS AS NEEDED
Status: DISCONTINUED | OUTPATIENT
Start: 2022-01-01 | End: 2022-01-01 | Stop reason: SURG

## 2022-01-01 RX ORDER — MORPHINE SULFATE 60 MG/1
60 TABLET, FILM COATED, EXTENDED RELEASE ORAL EVERY 8 HOURS SCHEDULED
Qty: 90 TABLET | Refills: 0 | Status: CANCELLED | OUTPATIENT
Start: 2022-01-01

## 2022-01-01 RX ORDER — DROPERIDOL 2.5 MG/ML
0.62 INJECTION, SOLUTION INTRAMUSCULAR; INTRAVENOUS ONCE AS NEEDED
Status: DISCONTINUED | OUTPATIENT
Start: 2022-01-01 | End: 2022-01-01 | Stop reason: HOSPADM

## 2022-01-01 RX ORDER — SODIUM CHLORIDE 0.9 % (FLUSH) 0.9 %
3 SYRINGE (ML) INJECTION EVERY 12 HOURS SCHEDULED
Status: DISCONTINUED | OUTPATIENT
Start: 2022-01-01 | End: 2022-01-01 | Stop reason: HOSPADM

## 2022-01-01 RX ORDER — PHENYLEPHRINE HYDROCHLORIDE 10 MG/ML
INJECTION INTRAVENOUS AS NEEDED
Status: DISCONTINUED | OUTPATIENT
Start: 2022-01-01 | End: 2022-01-01 | Stop reason: SURG

## 2022-01-01 RX ORDER — MIDAZOLAM HYDROCHLORIDE 1 MG/ML
1 INJECTION INTRAMUSCULAR; INTRAVENOUS
Status: DISCONTINUED | OUTPATIENT
Start: 2022-01-01 | End: 2022-01-01 | Stop reason: HOSPADM

## 2022-01-01 RX ORDER — FENTANYL CITRATE/PF 100MCG/2ML
SYRINGE (ML) INTRAVENOUS CONTINUOUS
Status: DISCONTINUED | OUTPATIENT
Start: 2022-01-01 | End: 2022-01-01 | Stop reason: HOSPADM

## 2022-01-01 RX ORDER — ONDANSETRON 2 MG/ML
INJECTION INTRAMUSCULAR; INTRAVENOUS AS NEEDED
Status: DISCONTINUED | OUTPATIENT
Start: 2022-01-01 | End: 2022-01-01 | Stop reason: SURG

## 2022-01-01 RX ORDER — FAMOTIDINE 10 MG/ML
20 INJECTION, SOLUTION INTRAVENOUS ONCE
Status: CANCELLED | OUTPATIENT
Start: 2022-01-01

## 2022-01-01 RX ORDER — OXYCODONE HYDROCHLORIDE 10 MG/1
TABLET ORAL
Qty: 200 TABLET | Refills: 0 | Status: CANCELLED | OUTPATIENT
Start: 2022-01-01

## 2022-01-01 RX ORDER — LIDOCAINE AND PRILOCAINE 25; 25 MG/G; MG/G
CREAM TOPICAL
Qty: 30 G | Refills: 3 | Status: SHIPPED | OUTPATIENT
Start: 2022-01-01

## 2022-01-01 RX ORDER — FOLIC ACID 1 MG/1
1 TABLET ORAL DAILY
Qty: 30 TABLET | Refills: 5 | Status: CANCELLED | OUTPATIENT
Start: 2022-01-01

## 2022-01-01 RX ORDER — SODIUM CHLORIDE 9 MG/ML
250 INJECTION, SOLUTION INTRAVENOUS ONCE
Status: CANCELLED | OUTPATIENT
Start: 2022-01-01

## 2022-01-01 RX ORDER — MAGNESIUM HYDROXIDE 1200 MG/15ML
LIQUID ORAL AS NEEDED
Status: DISCONTINUED | OUTPATIENT
Start: 2022-01-01 | End: 2022-01-01 | Stop reason: HOSPADM

## 2022-01-01 RX ORDER — SODIUM CHLORIDE, SODIUM LACTATE, POTASSIUM CHLORIDE, CALCIUM CHLORIDE 600; 310; 30; 20 MG/100ML; MG/100ML; MG/100ML; MG/100ML
125 INJECTION, SOLUTION INTRAVENOUS ONCE
Status: COMPLETED | OUTPATIENT
Start: 2022-01-01 | End: 2022-01-01

## 2022-01-01 RX ORDER — FOLIC ACID 1 MG/1
1 TABLET ORAL DAILY
Qty: 30 TABLET | Refills: 5 | Status: SHIPPED | OUTPATIENT
Start: 2022-01-01

## 2022-01-01 RX ORDER — HYDROCODONE BITARTRATE AND ACETAMINOPHEN 5; 325 MG/1; MG/1
1 TABLET ORAL EVERY 6 HOURS PRN
Qty: 5 TABLET | Refills: 0 | Status: SHIPPED | OUTPATIENT
Start: 2022-01-01 | End: 2022-01-01

## 2022-01-01 RX ORDER — AMOXICILLIN 250 MG
2 CAPSULE ORAL DAILY
Qty: 60 TABLET | Refills: 5 | Status: CANCELLED | OUTPATIENT
Start: 2022-01-01

## 2022-01-01 RX ORDER — SODIUM CHLORIDE 9 MG/ML
1000 INJECTION, SOLUTION INTRAVENOUS ONCE
Status: COMPLETED | OUTPATIENT
Start: 2022-01-01 | End: 2022-01-01

## 2022-01-01 RX ORDER — DIAZEPAM 5 MG/1
TABLET ORAL
Qty: 6 TABLET | Refills: 0 | Status: SHIPPED | OUTPATIENT
Start: 2022-01-01

## 2022-01-01 RX ORDER — FENTANYL CITRATE/PF 100MCG/2ML
SYRINGE (ML) INTRAVENOUS CONTINUOUS
Status: DISCONTINUED | OUTPATIENT
Start: 2022-01-01 | End: 2022-01-01

## 2022-01-01 RX ORDER — PALONOSETRON 0.05 MG/ML
0.25 INJECTION, SOLUTION INTRAVENOUS ONCE
Status: CANCELLED | OUTPATIENT
Start: 2022-01-01

## 2022-01-01 RX ORDER — OLANZAPINE 5 MG/1
5 TABLET ORAL NIGHTLY
Qty: 3 TABLET | Refills: 5 | Status: CANCELLED | OUTPATIENT
Start: 2022-01-01

## 2022-01-01 RX ORDER — OXYCODONE HYDROCHLORIDE 5 MG/1
10 TABLET ORAL ONCE
Status: COMPLETED | OUTPATIENT
Start: 2022-01-01 | End: 2022-01-01

## 2022-01-01 RX ORDER — POTASSIUM CHLORIDE 750 MG/1
40 TABLET, FILM COATED, EXTENDED RELEASE ORAL ONCE
Status: COMPLETED | OUTPATIENT
Start: 2022-01-01 | End: 2022-01-01

## 2022-01-01 RX ORDER — NOREPINEPHRINE BIT/0.9 % NACL 8 MG/250ML
.02-.3 INFUSION BOTTLE (ML) INTRAVENOUS
Status: DISCONTINUED | OUTPATIENT
Start: 2022-01-01 | End: 2022-01-01

## 2022-01-01 RX ORDER — HEPARIN SODIUM (PORCINE) LOCK FLUSH IV SOLN 100 UNIT/ML 100 UNIT/ML
SOLUTION INTRAVENOUS AS NEEDED
Status: DISCONTINUED | OUTPATIENT
Start: 2022-01-01 | End: 2022-01-01 | Stop reason: HOSPADM

## 2022-01-01 RX ORDER — PROCHLORPERAZINE MALEATE 10 MG
10 TABLET ORAL EVERY 6 HOURS PRN
Qty: 60 TABLET | Refills: 3 | Status: SHIPPED | OUTPATIENT
Start: 2022-01-01

## 2022-01-01 RX ORDER — PROCHLORPERAZINE MALEATE 10 MG
10 TABLET ORAL EVERY 6 HOURS PRN
Qty: 60 TABLET | Refills: 3 | Status: SHIPPED | OUTPATIENT
Start: 2022-01-01 | End: 2022-01-01 | Stop reason: SDUPTHER

## 2022-01-01 RX ORDER — MORPHINE SULFATE 60 MG/1
60 TABLET, FILM COATED, EXTENDED RELEASE ORAL EVERY 8 HOURS SCHEDULED
Qty: 90 TABLET | Refills: 0 | Status: SHIPPED | OUTPATIENT
Start: 2022-01-01 | End: 2022-01-01 | Stop reason: SDUPTHER

## 2022-01-01 RX ORDER — LIDOCAINE HYDROCHLORIDE 20 MG/ML
INJECTION, SOLUTION INFILTRATION; PERINEURAL AS NEEDED
Status: DISCONTINUED | OUTPATIENT
Start: 2022-01-01 | End: 2022-01-01 | Stop reason: SURG

## 2022-01-01 RX ADMIN — SODIUM CHLORIDE, POTASSIUM CHLORIDE, SODIUM LACTATE AND CALCIUM CHLORIDE 125 ML/HR: 600; 310; 30; 20 INJECTION, SOLUTION INTRAVENOUS at 07:45

## 2022-01-01 RX ADMIN — SODIUM BICARBONATE 150 MEQ: 84 INJECTION INTRAVENOUS at 08:24

## 2022-01-01 RX ADMIN — PACLITAXEL 280 MG: 6 INJECTION, SOLUTION INTRAVENOUS at 12:04

## 2022-01-01 RX ADMIN — CARBOPLATIN 540 MG: 10 INJECTION, SOLUTION INTRAVENOUS at 15:19

## 2022-01-01 RX ADMIN — CEFTRIAXONE 1 G: 10 INJECTION, POWDER, FOR SOLUTION INTRAVENOUS at 15:04

## 2022-01-01 RX ADMIN — DEXAMETHASONE SODIUM PHOSPHATE 20 MG: 4 INJECTION, SOLUTION INTRAMUSCULAR; INTRAVENOUS at 11:01

## 2022-01-01 RX ADMIN — PHENYLEPHRINE HYDROCHLORIDE 100 MCG: 10 INJECTION INTRAVENOUS at 08:03

## 2022-01-01 RX ADMIN — CEFTRIAXONE 1 G: 10 INJECTION, POWDER, FOR SOLUTION INTRAVENOUS at 15:06

## 2022-01-01 RX ADMIN — PALONOSETRON 0.25 MG: 0.25 INJECTION, SOLUTION INTRAVENOUS at 10:15

## 2022-01-01 RX ADMIN — Medication: at 10:37

## 2022-01-01 RX ADMIN — NALOXONE HYDROCHLORIDE 2 MG: 1 INJECTION PARENTERAL at 07:43

## 2022-01-01 RX ADMIN — CEFTRIAXONE 1 G: 10 INJECTION, POWDER, FOR SOLUTION INTRAVENOUS at 15:43

## 2022-01-01 RX ADMIN — FAMOTIDINE 20 MG: 10 INJECTION INTRAVENOUS at 08:02

## 2022-01-01 RX ADMIN — DIPHENHYDRAMINE HYDROCHLORIDE 50 MG: 50 INJECTION, SOLUTION INTRAMUSCULAR; INTRAVENOUS at 10:15

## 2022-01-01 RX ADMIN — HEPARIN SODIUM (PORCINE) LOCK FLUSH IV SOLN 100 UNIT/ML 500 UNITS: 100 SOLUTION at 14:19

## 2022-01-01 RX ADMIN — OLANZAPINE 5 MG: 5 TABLET, FILM COATED ORAL at 10:14

## 2022-01-01 RX ADMIN — HEPARIN SODIUM (PORCINE) LOCK FLUSH IV SOLN 100 UNIT/ML 500 UNITS: 100 SOLUTION at 15:15

## 2022-01-01 RX ADMIN — CEFTRIAXONE 1 G: 10 INJECTION, POWDER, FOR SOLUTION INTRAVENOUS at 14:34

## 2022-01-01 RX ADMIN — ALTEPLASE: 2.2 INJECTION, POWDER, LYOPHILIZED, FOR SOLUTION INTRAVENOUS at 09:30

## 2022-01-01 RX ADMIN — Medication 10 ML: at 15:07

## 2022-01-01 RX ADMIN — PEGFILGRASTIM-CBQV 6 MG: 6 INJECTION, SOLUTION SUBCUTANEOUS at 15:22

## 2022-01-01 RX ADMIN — HEPARIN SODIUM (PORCINE) LOCK FLUSH IV SOLN 100 UNIT/ML 500 UNITS: 100 SOLUTION at 14:35

## 2022-01-01 RX ADMIN — HEPARIN SODIUM (PORCINE) LOCK FLUSH IV SOLN 100 UNIT/ML 500 UNITS: 100 SOLUTION at 15:58

## 2022-01-01 RX ADMIN — SODIUM CHLORIDE 1000 ML: 9 INJECTION, SOLUTION INTRAVENOUS at 10:14

## 2022-01-01 RX ADMIN — POTASSIUM CHLORIDE 40 MEQ: 20 TABLET, EXTENDED RELEASE ORAL at 10:44

## 2022-01-01 RX ADMIN — HEPARIN SODIUM (PORCINE) LOCK FLUSH IV SOLN 100 UNIT/ML 500 UNITS: 100 SOLUTION at 14:40

## 2022-01-01 RX ADMIN — CEFTRIAXONE 1 G: 10 INJECTION, POWDER, FOR SOLUTION INTRAVENOUS at 14:39

## 2022-01-01 RX ADMIN — Medication 10 ML: at 15:58

## 2022-01-01 RX ADMIN — Medication 10 ML: at 15:11

## 2022-01-01 RX ADMIN — FLUDEOXYGLUCOSE F18 1 DOSE: 300 INJECTION INTRAVENOUS at 11:25

## 2022-01-01 RX ADMIN — HEPARIN SODIUM (PORCINE) LOCK FLUSH IV SOLN 100 UNIT/ML 500 UNITS: 100 SOLUTION at 15:10

## 2022-01-01 RX ADMIN — PROPOFOL 100 MG: 10 INJECTION, EMULSION INTRAVENOUS at 08:02

## 2022-01-01 RX ADMIN — CEFTRIAXONE SODIUM 1 G: 1 INJECTION, POWDER, FOR SOLUTION INTRAMUSCULAR; INTRAVENOUS at 15:00

## 2022-01-01 RX ADMIN — NALOXONE HYDROCHLORIDE 2 MG: 1 INJECTION INTRAMUSCULAR; INTRAVENOUS; SUBCUTANEOUS at 08:14

## 2022-01-01 RX ADMIN — SODIUM CHLORIDE 1000 ML: 9 INJECTION, SOLUTION INTRAVENOUS at 10:43

## 2022-01-01 RX ADMIN — DIPHENHYDRAMINE HYDROCHLORIDE 50 MG: 50 INJECTION, SOLUTION INTRAMUSCULAR; INTRAVENOUS at 10:48

## 2022-01-01 RX ADMIN — Medication 500 UNITS: at 15:15

## 2022-01-01 RX ADMIN — SODIUM CHLORIDE, POTASSIUM CHLORIDE, SODIUM LACTATE AND CALCIUM CHLORIDE: 600; 310; 30; 20 INJECTION, SOLUTION INTRAVENOUS at 07:56

## 2022-01-01 RX ADMIN — SODIUM CHLORIDE 1000 ML: 9 INJECTION, SOLUTION INTRAVENOUS at 08:00

## 2022-01-01 RX ADMIN — DEXAMETHASONE SODIUM PHOSPHATE 20 MG: 4 INJECTION, SOLUTION INTRAMUSCULAR; INTRAVENOUS at 10:36

## 2022-01-01 RX ADMIN — HEPARIN SODIUM (PORCINE) LOCK FLUSH IV SOLN 100 UNIT/ML 500 UNITS: 100 SOLUTION at 14:41

## 2022-01-01 RX ADMIN — HEPARIN SODIUM (PORCINE) LOCK FLUSH IV SOLN 100 UNIT/ML 500 UNITS: 100 SOLUTION at 15:06

## 2022-01-01 RX ADMIN — OLANZAPINE 5 MG: 5 TABLET, FILM COATED ORAL at 10:44

## 2022-01-01 RX ADMIN — FAMOTIDINE 20 MG: 10 INJECTION, SOLUTION INTRAVENOUS at 10:16

## 2022-01-01 RX ADMIN — OXYCODONE 10 MG: 5 TABLET ORAL at 12:23

## 2022-01-01 RX ADMIN — SODIUM CHLORIDE 150 MG: 9 INJECTION, SOLUTION INTRAVENOUS at 11:19

## 2022-01-01 RX ADMIN — AZITHROMYCIN MONOHYDRATE 500 MG: 500 INJECTION, POWDER, LYOPHILIZED, FOR SOLUTION INTRAVENOUS at 08:34

## 2022-01-01 RX ADMIN — CEFTRIAXONE 1 G: 1 INJECTION, POWDER, FOR SOLUTION INTRAMUSCULAR; INTRAVENOUS at 08:55

## 2022-01-01 RX ADMIN — POTASSIUM CHLORIDE 40 MEQ: 750 TABLET, FILM COATED, EXTENDED RELEASE ORAL at 10:14

## 2022-01-01 RX ADMIN — SODIUM CHLORIDE 1000 ML: 9 INJECTION, SOLUTION INTRAVENOUS at 08:43

## 2022-01-01 RX ADMIN — Medication 10 ML: at 15:57

## 2022-01-01 RX ADMIN — Medication 10 ML: at 14:41

## 2022-01-01 RX ADMIN — FAMOTIDINE 20 MG: 10 INJECTION, SOLUTION INTRAVENOUS at 10:46

## 2022-01-01 RX ADMIN — PACLITAXEL 290 MG: 6 INJECTION, SOLUTION INTRAVENOUS at 11:40

## 2022-01-01 RX ADMIN — CEFTRIAXONE 1 G: 10 INJECTION, POWDER, FOR SOLUTION INTRAVENOUS at 14:37

## 2022-01-01 RX ADMIN — HEPARIN SODIUM (PORCINE) LOCK FLUSH IV SOLN 100 UNIT/ML 500 UNITS: 100 SOLUTION at 15:13

## 2022-01-01 RX ADMIN — HEPARIN SODIUM (PORCINE) LOCK FLUSH IV SOLN 100 UNIT/ML 500 UNITS: 100 SOLUTION at 15:11

## 2022-01-01 RX ADMIN — CEFTRIAXONE 1 G: 10 INJECTION, POWDER, FOR SOLUTION INTRAVENOUS at 14:45

## 2022-01-01 RX ADMIN — Medication 10 ML: at 15:15

## 2022-01-01 RX ADMIN — Medication: at 12:58

## 2022-01-01 RX ADMIN — Medication 10 ML: at 14:40

## 2022-01-01 RX ADMIN — NALOXONE HYDROCHLORIDE 2 MG: 1 INJECTION PARENTERAL at 07:58

## 2022-01-01 RX ADMIN — FENTANYL CITRATE 100 MCG: 50 INJECTION INTRAMUSCULAR; INTRAVENOUS at 07:56

## 2022-01-01 RX ADMIN — CEFTRIAXONE 1 G: 10 INJECTION, POWDER, FOR SOLUTION INTRAVENOUS at 14:14

## 2022-01-01 RX ADMIN — PALONOSETRON 0.25 MG: 0.25 INJECTION, SOLUTION INTRAVENOUS at 10:45

## 2022-01-01 RX ADMIN — LIDOCAINE HYDROCHLORIDE 60 MG: 20 INJECTION, SOLUTION INFILTRATION; PERINEURAL at 08:02

## 2022-01-01 RX ADMIN — ONDANSETRON 4 MG: 2 INJECTION INTRAMUSCULAR; INTRAVENOUS at 08:02

## 2022-01-01 RX ADMIN — Medication 10 ML: at 14:19

## 2022-01-01 RX ADMIN — MIDAZOLAM 2 MG: 1 INJECTION INTRAMUSCULAR; INTRAVENOUS at 07:56

## 2022-01-01 RX ADMIN — SODIUM CHLORIDE 150 MG: 9 INJECTION, SOLUTION INTRAVENOUS at 11:00

## 2022-01-01 RX ADMIN — CARBOPLATIN 570 MG: 10 INJECTION, SOLUTION INTRAVENOUS at 14:56

## 2022-01-11 PROBLEM — L98.9 SKIN LESION: Status: ACTIVE | Noted: 2022-01-01

## 2022-01-11 PROBLEM — K61.0 ANAL ABSCESS: Status: ACTIVE | Noted: 2022-01-01

## 2022-01-11 PROBLEM — K61.0 ANAL ABSCESS: Status: RESOLVED | Noted: 2022-01-01 | Resolved: 2022-01-01

## 2022-01-11 NOTE — PROGRESS NOTES
Subjective   Sharif Looney is a 56 y.o. male.     Chief Complaint: skin lesion in buttock/anal area    History of Present Illness He is a 57 yo with a long history of multiple skin cysts and infection. He has had them in the axilla back and in perineum/buttock area. He has developed a hard lump just left of the anal area over the last few months.     The following portions of the patient's history were reviewed and updated as appropriate: current medications, past family history, past medical history, past social history, past surgical history and problem list.    Review of Systems   Constitutional: Negative for activity change, appetite change, chills, fever and unexpected weight change.   HENT: Negative for congestion, facial swelling and sore throat.    Eyes: Negative for photophobia and visual disturbance.   Respiratory: Negative for chest tightness, shortness of breath and wheezing.    Cardiovascular: Negative for chest pain, palpitations and leg swelling.   Gastrointestinal: Positive for rectal pain. Negative for abdominal distention, abdominal pain, anal bleeding, blood in stool, constipation, diarrhea, nausea and vomiting.   Endocrine: Negative for cold intolerance, heat intolerance, polydipsia and polyuria.   Genitourinary: Negative for difficulty urinating, dysuria, flank pain and urgency.   Musculoskeletal: Negative for back pain and myalgias.   Skin: Positive for color change and wound. Negative for rash.   Allergic/Immunologic: Negative for immunocompromised state.   Neurological: Negative for dizziness, seizures, syncope, light-headedness, numbness and headaches.   Hematological: Negative for adenopathy. Does not bruise/bleed easily.   Psychiatric/Behavioral: Negative for behavioral problems and confusion. The patient is not nervous/anxious.        Objective   Physical Exam  Vitals reviewed.   Constitutional:       General: He is not in acute distress.     Appearance: He is well-developed. He is not  ill-appearing.       HENT:      Head: Normocephalic. No laceration. Hair is normal.      Right Ear: Hearing and ear canal normal.      Left Ear: Hearing and ear canal normal.      Nose: Nose normal.      Right Sinus: No maxillary sinus tenderness or frontal sinus tenderness.      Left Sinus: No maxillary sinus tenderness or frontal sinus tenderness.   Eyes:      General: Lids are normal.      Conjunctiva/sclera: Conjunctivae normal.      Pupils: Pupils are equal, round, and reactive to light.   Neck:      Thyroid: No thyroid mass or thyromegaly.      Vascular: No JVD.      Trachea: No tracheal tenderness or tracheal deviation.   Cardiovascular:      Rate and Rhythm: Normal rate and regular rhythm.      Heart sounds: No murmur heard.  No gallop.    Pulmonary:      Effort: Pulmonary effort is normal.      Breath sounds: Normal breath sounds. No stridor. No wheezing.   Chest:      Chest wall: No tenderness.   Breasts:      Right: No supraclavicular adenopathy.      Left: No supraclavicular adenopathy.       Abdominal:      General: Bowel sounds are normal. There is no distension.      Palpations: Abdomen is soft. There is no mass.      Tenderness: There is no abdominal tenderness. There is no guarding or rebound.      Hernia: No hernia is present.   Musculoskeletal:         General: No deformity.      Cervical back: Normal range of motion.   Lymphadenopathy:      Cervical: No cervical adenopathy.      Upper Body:      Right upper body: No supraclavicular adenopathy.      Left upper body: No supraclavicular adenopathy.   Skin:     General: Skin is warm and dry.      Coloration: Skin is not pale.      Findings: Erythema and lesion present. No rash.   Neurological:      Mental Status: He is alert and oriented to person, place, and time.      Motor: No abnormal muscle tone.   Psychiatric:         Behavior: Behavior normal.         Thought Content: Thought content normal.     the buttock lesion had a small incisional biopsy  with lidocaine.    History reviewed. No pertinent past medical history.    History reviewed. No pertinent family history.    Social History     Tobacco Use   • Smoking status: Current Every Day Smoker     Types: Cigarettes   • Smokeless tobacco: Never Used   Vaping Use   • Vaping Use: Never used   Substance Use Topics   • Alcohol use: Not on file   • Drug use: Not on file       Past Surgical History:   Procedure Laterality Date   • BACK SURGERY     • TUMOR REMOVAL      knee       Current Outpatient Medications   Medication Instructions   • diclofenac (VOLTAREN) 75 mg, Oral, 2 Times Daily   • HYDROcodone-acetaminophen (NORCO) 7.5-325 MG per tablet 1 tablet, Oral, Every 6 Hours PRN   • tiZANidine (ZANAFLEX) 4 mg, Oral, Every 8 Hours PRN         Assessment/Plan   Skin lesion on buttock that is likely a carcinoma  Follow up on pathology

## 2022-01-20 PROBLEM — C21.0 ANAL SQUAMOUS CELL CARCINOMA (HCC): Status: ACTIVE | Noted: 2022-01-01

## 2022-01-20 NOTE — PROGRESS NOTES
Subjective   Sharif Looney is a 56 y.o. male.     Chief Complaint: anal squamous carcinoma    History of Present Illness His mass near the anal area is squamous carcinoma.     The following portions of the patient's history were reviewed and updated as appropriate: current medications, past family history, past medical history, past social history, past surgical history and problem list.    Review of Systems    Objective   Physical Exam large mass just lateral to the anal area    No past medical history on file.    No family history on file.    Social History     Tobacco Use   • Smoking status: Current Every Day Smoker     Types: Cigarettes   • Smokeless tobacco: Never Used   Vaping Use   • Vaping Use: Never used   Substance Use Topics   • Alcohol use: Not on file   • Drug use: Not on file       Past Surgical History:   Procedure Laterality Date   • BACK SURGERY     • TUMOR REMOVAL      knee       Current Outpatient Medications   Medication Instructions   • diclofenac (VOLTAREN) 75 mg, Oral, 2 Times Daily   • HYDROcodone-acetaminophen (NORCO) 7.5-325 MG per tablet 1 tablet, Oral, Every 6 Hours PRN   • tiZANidine (ZANAFLEX) 4 mg, Oral, Every 8 Hours PRN         Assessment/Plan   Diagnoses and all orders for this visit:    1. Anal squamous cell carcinoma (HCC) (Primary)    refer to oncology and radiation oncology for chemo and radiation

## 2022-01-27 NOTE — TELEPHONE ENCOUNTER
Caller: Sharif Looney    Relationship: Self        What was the call regarding: STATED CALLED EARLIER , OXYCODONE 5MG WAS SENT TO [facility] YESTERDAY AND [facility] DID NOT HAVE THE OXYCODONE WHEN GOING TO PICK IT UP AND SINCE IN RAD ONC APPT TODAY AT THE HOSPITAL WANTED TO SEE IF CAN HAVE SENT TO [facility] TO  TODAY AFTER APPT.     I DID NOT SEE NOTE IN CHART WHERE HE CALLED EARLIER OR THAT THE SCRIPT HAD BEEN SENT YET TO [facility]    I CALLED CLINICAL LINE SPOKE TO TAMMI AND SHE ADVISED WOULD CALL AND LET DR LEWIS NURSE KNOW AND HAVE THAT SENT OVER FOR THE PATIENT     I ADVISED THIS TO SHARIF AND HE V/U .

## 2022-01-27 NOTE — PROGRESS NOTES
"     New Patient Office Consult      Patient Name: Sharif Looney  : 1965   MRN: [id number]     Requesting Physician: Carrington Alexander MD    Chief Complaint: Anal Carcinoma    Pathology: SCC  Staging: IIb (T3,N0,M0)    History of Present Illness: Sharif Looney is a pleasant 56 y.o. male who is here today for consultation regarding radiation therapy for newly diagnosed anal squamous cell carcinoma.    Patient has a long history of cysts, he states that in the past they rupture and drain and resolve.  However, about a year ago he noticed what he thought was a cyst to the left buttock/anal opening area.  After while the \"cyst\" continue to grow and never ruptured/drained like his multiple cysts had in the past.  It had also become increasingly painful.  He was sent to Dr. Alexander for excision of the area and a biopsy was performed on 2022.  Pathology from this biopsy revealed squamous cell carcinoma of the anal origin.  He was then referred to Dr. Peralta and me for chemotherapy/radiation.  He was seen by Dr. Peralta on 2022.  An appointment with Dr. Allison was made for port placement.  A PET CT and MRI of the pelvis was ordered as well.  He was placed on oxycodone as needed for pain management.    In our office today the patient has complaints of pain in the area of the mass.  No anal bleeding, blood in the stool, constipation, diarrhea, nausea, or vomiting was reported today.    Subjective      Review of Systems:   Review of Systems   Constitutional: Negative.    HENT: Negative.    Eyes: Negative.    Respiratory: Negative.    Cardiovascular: Negative.    Gastrointestinal: Negative for anal bleeding, blood in stool, constipation and diarrhea.        Anal pain and drainage    Endocrine: Negative.    Genitourinary: Negative.    Musculoskeletal: Negative.    Skin: Positive for wound.   Neurological: Negative.    Hematological: Negative.    Psychiatric/Behavioral: Negative.      Review of Systems   Constitutional: " Negative.    HENT:  Negative.    Eyes: Negative.    Respiratory: Negative.    Cardiovascular: Negative.    Gastrointestinal: Negative for anal bleeding, blood in stool, constipation and diarrhea.        Anal pain and drainage    Endocrine: Negative.    Genitourinary: Negative.     Musculoskeletal: Negative.    Skin: Positive for wound.   Neurological: Negative.    Hematological: Negative.    Psychiatric/Behavioral: Negative.        I have reviewed and confirmed the accuracy of the ROS as documented by the MA/LPN/RN MC Garcia     Past Oncology History:   Oncology/Hematology History    No history exists.        Past Radiation History:  No previous exposures to ionizing radiation therapy and there are not relative contraindications including connective tissue disorder.     Past Medical History: History reviewed. No pertinent past medical history.    Past Surgical History:   Past Surgical History:   Procedure Laterality Date   • BACK SURGERY     • TUMOR REMOVAL      knee       Family History:   Family History   Problem Relation Age of Onset   • Cancer Mother         lung   • Cancer Brother         throat       Social History:   Social History     Socioeconomic History   • Marital status: Single   Tobacco Use   • Smoking status: Current Every Day Smoker     Types: Cigarettes   • Smokeless tobacco: Never Used   Vaping Use   • Vaping Use: Never used   Substance and Sexual Activity   • Alcohol use: Never   • Drug use: Never   • Sexual activity: Defer       Medications:     Current Outpatient Medications:   •  cetirizine (zyrTEC) 10 MG tablet, Take 1 tablet by mouth Daily., Disp: , Rfl:   •  diclofenac (VOLTAREN) 75 MG EC tablet, Take 1 tablet by mouth 2 (Two) Times a Day., Disp: 30 tablet, Rfl: 0  •  HYDROcodone-acetaminophen (NORCO) 5-325 MG per tablet, Take one every 6 hours as needed for pain., Disp: 15 tablet, Rfl: 0  •  HYDROcodone-acetaminophen (NORCO) 7.5-325 MG per tablet, Take 1 tablet by mouth  Every 6 (Six) Hours As Needed for Moderate Pain ., Disp: 10 tablet, Rfl: 0  •  sulfamethoxazole-trimethoprim (Bactrim DS) 800-160 MG per tablet, Take 1 tablet by mouth 2 (Two) Times a Day for 10 days., Disp: 20 tablet, Rfl: 0  •  tiZANidine (ZANAFLEX) 4 MG tablet, Take 1 tablet by mouth Every 8 (Eight) Hours As Needed for Muscle Spasms., Disp: 30 tablet, Rfl: 0  •  oxyCODONE (ROXICODONE) 5 MG immediate release tablet, Take 1-3 tabs every 4-6 hours as needed for pain, Disp: 200 tablet, Rfl: 0    Allergies:   Allergies   Allergen Reactions   • Mupirocin Hives       KPS: 80 %     Results Review:   The following data was reviewed by: MC Garcia on 01/27/2022:  Common labs    Common Labsle 1/26/22 1/26/22    1541 1541   Glucose  100 (A)   BUN  8   Creatinine  0.66 (A)   eGFR Non African Am  125   Sodium  137   Potassium  3.4 (A)   Chloride  101   Calcium  8.8   Albumin  3.04 (A)   Total Bilirubin  0.2   Alkaline Phosphatase  104   AST (SGOT)  11   ALT (SGPT)  6   WBC 17.03 (A)    Hemoglobin 10.6 (A)    Hematocrit 34.0 (A)    Platelets 419    (A) Abnormal value                  Imaging:  PET CT and MRI of the pelvis with and without contrast are ordered but have not been performed yet.    X-ray spine lumbar-7/29/2021  IMPRESSION:  Degenerative disc change in the lumbar disc spaces with mild  rotational scoliosis.    X-ray bilateral hips-7/29/2021  IMPRESSION:  Negative plain films of both hips.      Pathology:  1/11/2022      Objective     Physical Exam:  Physical Exam  Constitutional:       General: He is not in acute distress.     Appearance: Normal appearance. He is normal weight. He is not ill-appearing.       HENT:      Head: Normocephalic.      Nose: Nose normal.      Mouth/Throat:      Mouth: Mucous membranes are moist.      Pharynx: Oropharynx is clear.   Eyes:      Extraocular Movements: Extraocular movements intact.      Conjunctiva/sclera: Conjunctivae normal.      Pupils: Pupils are equal,  round, and reactive to light.   Cardiovascular:      Rate and Rhythm: Normal rate and regular rhythm.      Pulses: Normal pulses.      Heart sounds: Normal heart sounds.   Pulmonary:      Effort: Pulmonary effort is normal. No respiratory distress.      Breath sounds: Normal breath sounds.   Abdominal:      General: Abdomen is flat. Bowel sounds are normal. There is no distension.      Palpations: Abdomen is soft. There is no mass.   Genitourinary:     Comments: Pain/tenderness of the anal area/buttock. Mass noted to the left buttock area. Multiple draining cysts present. Multiple scars noted to the buttock area from previous cysts.    Musculoskeletal:         General: No swelling or tenderness. Normal range of motion.      Cervical back: Normal range of motion.   Skin:     General: Skin is warm and dry.      Capillary Refill: Capillary refill takes less than 2 seconds.   Neurological:      General: No focal deficit present.      Mental Status: He is alert and oriented to person, place, and time. Mental status is at baseline.   Psychiatric:         Mood and Affect: Mood normal.         Behavior: Behavior normal.         Thought Content: Thought content normal.         Judgment: Judgment normal.         Vital Signs:   Vitals:    01/27/22 0935   BP: 142/90   Pulse: (!) 127   Resp: 18   Temp: 98.4 °F (36.9 °C)   TempSrc: Temporal   SpO2: 98%   Weight: 69.4 kg (153 lb)   PainSc: 10-Worst pain ever   PainLoc: Buttocks     Body mass index is 21.95 kg/m².       Assessment / Plan      Assessment/Plan:   Mr. Looney is a 56-year-old male with a clinical stage IIb T3 N0 M0 squamous carcinoma cell carcinoma of the anal verge invading the buttock into the anus.  He has in the past had cysts in the axilla back and in perineum/buttock area. However, this time he developed a hard lump just left of the anal area over the last few months.     Concurrent chemotherapy and radiation yield results superior to those of radiation alone or  surgery alone. Techniques associated with radiation therapy have evolved with the utilization of intensity-modulated radiation therapy (IMRT).  IMRT is clinically associated with decreased acute toxicity when compared to historical outcomes. RTOG 0529 is a phase II study evaluating patients with anal cancer treated with IMRT chemoradiation.  T3-4N0 patients received treatment with a SIB plan delivering 54 Gy to the primary tumor and 45 Gy to elective nodes in 30 fractions.  We will need an MRI and the PETimaging to aid with treatment planning.    We will need to proceed with prone setup for improved small bowel avoidance and make a perianal bolus placement possible. Patient will need to maintain a full bladder simulation treatment with IV contrast and oral contrast.    Toxicity may include skin break, infection, ulceration and diarrhea.  Given the size of tumor if we don't achieve CR after CCRT a boost with GRID therapy will be needed.    Patient agreed and will proceed with CT sim.  Appropriate education was provided to the patient and explained in detail.         CC: Yesenia Peralta M.D., Jovanni Barnhart MD, personally performed the services described in this documentation as scribed by the above named individual in my presence, and it is both accurate and complete.  1/27/2022  11:13 EST     Electronically signed by Jovanni Barnhart MD, 01/27/22, 11:13 AM EST.  Follow Up:   CT SIM scheduled for 2/2/2022  Covid swab to be performed 24 to 48 hours before simulation  Wound care consult ordered    Scribed for Dr. Jovanni Barnhart MD by MC Tovar 1/27/2022  10:15 EST

## 2022-01-28 NOTE — TELEPHONE ENCOUNTER
PAT and Covid test Tuesday 2/1 @ 230 pm  Surgery Thursday 2/3 @ 730 am  Patient needs to be NPO and have ad river with him.

## 2022-02-01 NOTE — DISCHARGE INSTRUCTIONS
TAKE the following medications the morning of surgery:    All heart or blood pressure medications    Please discontinue all blood thinners and anticoagulants (except aspirin) prior to surgery as per your surgeon and cardiologist instructions.  Aspirin may be continued up to the day prior to surgery.    HOLD all diabetic medications the morning of surgery as order by physician.    Please follow instructions on use of prep cloths provided by nurse. Return instruction sheet to pre-op nurse on day of surgery.    Arrival time for surgery on 2/3/22 is 0730 am per Dr. Alexander's office.    A RESPONSIBLE PERSON MUST REMAIN IN THE WAITING ROOM DURING YOUR PROCEDURE AND A RESPONSIBLE  MUST BE AVAILABLE UPON YOUR DISCHARGE.    General Instructions:  • Do NOT eat or drink after midnight 2/2/22 which includes water, mints, or gum.  • You may brush your teeth. Dental appliances that are removable must be taken out day of surgery.  • Do NOT smoke, chew tobacco, or drink alcohol within 24 hours prior to surgery.  • Bring medications in original bottles, any inhalers and if applicable your C-PAP/BI-PAP machine  • Bring any papers given to you in the doctor’s office  • Wear clean, comfortable clothes and socks  • Do NOT wear contact lenses or make-up or dark nail polish.  Bring a case for your glasses if applicable.  • Bring crutches or walker if applicable  • Leave all other valuables and jewelry at home  • If you were given a blood bank armband, continue to wear it until discharged.    Preventing a Surgical Site Infection:  • Shower the night before surgery (unless instructed otherwise) using a fresh bar of anti-bacterial soap (such as Dial) and clean washcloth.  Dry with a clean towel and dress in clean clothing.  • For 2 to 3 days before surgery, avoid shaving with a razor near where you will have surgery because the razor can irritate skin and make it easier to develop an infection.  Ask your surgeon if you will be receiving  antibiotics prior to surgery.  • Make sure you, your family, and all healthcare providers clean their hands with soap and water or an alcohol-based hand  before caring for you or your wound.  • If at all possible, quit smoking as many days before surgery as you can.    Day of Surgery:  Upon arrival, a pre-op nurse and anesthesiologist will review your health history, obtain vital signs, and answer questions you may have.  The only belongings needed at this time will be your home medications and if applicable you C-PAP/BI-PAP machine.  If you are staying overnight, your family can leave the rest of your belongings in the car and bring them to your room later.  A pre-op nurse will start an IV and you may receive medication in preparation for surgery.  Due to patient privacy and limited space, only one member of your family will be able to accompany you in the pre-op area.  While you are in surgery your family should notify the waiting room  if they leave the waiting room area and provide a contact number.  Please be aware that surgery does come with discomfort.  We want to make every effort to control your discomfort so please discuss any uncontrolled symptoms with your nurse.  Your doctor will most likely have prescribed pain medications.  If you are going home after surgery you will receive individualized written care instructions before being discharged.  A responsible adult must drive you to and from the hospital on the day of surgery and stay with you for 24 hours.  If you are staying overnight following surgery, you will be transported to your hospital room following the recovery period.

## 2022-02-03 NOTE — ANESTHESIA POSTPROCEDURE EVALUATION
Patient: Sharif Looney    Procedure Summary     Date: 02/03/22 Room / Location: [facility] OR 01 /  [facility] OR    Anesthesia Start: 0756 Anesthesia Stop: 0829    Procedure: INSERTION OF PORTACATH (N/A ) Diagnosis:       Anal cancer (HCC)      (Anal cancer (HCC) [C21.0])    Surgeons: Carrington Alexander MD Provider: Miguel Angel Alvarez MD    Anesthesia Type: general, MAC ASA Status: 2          Anesthesia Type: general, MAC    Vitals  Vitals Value Taken Time   /82 02/03/22 0849   Temp 97.4 °F (36.3 °C) 02/03/22 0829   Pulse 90 02/03/22 0849   Resp 16 02/03/22 0849   SpO2 98 % 02/03/22 0849           Post Anesthesia Care and Evaluation    Patient location during evaluation: PHASE II  Patient participation: complete - patient participated  Level of consciousness: awake and alert  Pain score: 0  Pain management: adequate  Airway patency: patent  Anesthetic complications: No anesthetic complications    Cardiovascular status: acceptable  Respiratory status: acceptable  Hydration status: acceptable

## 2022-02-03 NOTE — ANESTHESIA PREPROCEDURE EVALUATION
Anesthesia Evaluation     no history of anesthetic complications:  NPO Solid Status: > 8 hours  NPO Liquid Status: > 8 hours           Airway   Mallampati: II  TM distance: >3 FB  Neck ROM: full  No difficulty expected  Dental    (+) poor dentition and lower dentures    Pulmonary - normal exam   (+) a smoker Current Smoked day of surgery,   Cardiovascular - normal exam        Neuro/Psych  GI/Hepatic/Renal/Endo      Musculoskeletal     Abdominal  - normal exam    Bowel sounds: normal.   Substance History      OB/GYN          Other      history of cancer active    ROS/Med Hx Other: Anal Squamous Cell                Anesthesia Plan    ASA 2     general and MAC     intravenous induction     Anesthetic plan, all risks, benefits, and alternatives have been provided, discussed and informed consent has been obtained with: patient.        CODE STATUS:

## 2022-02-03 NOTE — ANESTHESIA PROCEDURE NOTES
Airway  Urgency: elective    Date/Time: 2/3/2022 8:02 AM  Airway not difficult    General Information and Staff    Patient location during procedure: OR  Anesthesiologist: Miguel Angel Alvarez MD  CRNA: Zachary Merchant CRNA    Indications and Patient Condition  Indications for airway management: airway protection    Preoxygenated: yes  MILS maintained throughout  Mask difficulty assessment: 0 - not attempted    Final Airway Details  Final airway type: supraglottic airway      Successful airway: unique  Size 4    Number of attempts at approach: 1  Assessment: lips, teeth, and gum same as pre-op and atraumatic intubation    Additional Comments  Dentition & lips unchanged from preop

## 2022-02-03 NOTE — OP NOTE
INSERTION OF PORTACATH  Procedure Note    Sharif Looney  2/3/2022    Pre-op Diagnosis:   Anal cancer (HCC) [C21.0]    Post-op Diagnosis: same        Procedure(s):  INSERTION OF PORTACATH    Surgeon(s):  Carrington Alexander MD    Anesthesia: General, Monitored Anesthesia Care    Staff:   Circulator: Huy Eastman RN  Assistant: Jonh Baer  Other: Neema Sánchez    Estimated Blood Loss: minimal    Specimens:                * No orders in the log *      Drains: * No LDAs found *    Procedure: The upper chest and neck was prepped and draped. The right clavicle area was injected with local and the subclavian vein accessed. The wire threaded in easily. The incision was made with the scalpel and the cautery used to make a pocket. The catheter was trimmed and the port placed. The dilator and sheath was placed and catheter inserted. The sheath was pealed away and the port had good blood return and flushed easily. C arm confirmed position. The port was sutured in with vicryl and the wound closed with vicryl.     Findings:      Complications: none   Grafts / Implants N/A    Carrington Alexander MD     Date: 2/3/2022  Time: 08:31 EST

## 2022-02-07 NOTE — TELEPHONE ENCOUNTER
----- Message from Yesenia Peralta MD sent at 2/7/2022  2:53 PM EST -----  Ok.  Thank you.  I sent in a script for Morphine ER 30 mg q8h. Please ask him to start taking this and to take the Oxycodone just for breakthrough pain.  This should help to give him smoother pain control.    Thanks!  Margo  ----- Message -----  From: Marilee Nj RN  Sent: 2/7/2022   2:07 PM EST  To: Yesenia Peralta MD    Per the patient he is taking 18 tabs in total per 24 hours.   ----- Message -----  From: Yesenia Peralta MD  Sent: 2/7/2022   2:03 PM EST  To: Adriana Lovett RN, #    Thanks Marilee!    Just to clarify, he is taking 3 tabs 6 times/day or 18 tabs in 24 hrs?  Want to make sure of the total 24 hr dose so I can start him on something long acting.    Thanks,  Margo  ----- Message -----  From: Marilee Nj RN  Sent: 2/7/2022   9:03 AM EST  To: Yesenia Peralta MD, #    Just wanted to keep you all in the loop. The patient called today asking for his pain medication to be refilled. It was filled on 1/27. Its written to take 1-3 tabs ever 4-6 hours. He stated that he was having to take 3 tabs every 4 hours so he was almost out of medicine. I spoke to Dr. Moncada about it and we gave him a 3 day prescription so that you all could decide the best thing for him long term.   Thanks!

## 2022-02-07 NOTE — TELEPHONE ENCOUNTER
Spoke to the pt and informed him of the Morphine 30 mg called in to help with pain. Instructed him to take this every 8 hours and to only use the oxycodone for break through pain. Pt verbalizes understanding. No questions or concerns at this time.

## 2022-02-09 NOTE — TELEPHONE ENCOUNTER
Provider: DEBBIE    Caller: JANEL    Relationship to Patient: SELF      Reason for Call: JANEL IS CALLING TO LET DR LEWIS KNOW THAT HE WAS UNABLE TO DO HIS MRI YESTERDAY.    HE STATES THAT HE HAS A PERLA ON HIS BUTT AND HE CAN NOT LAY FLAT.    HE IS CONCERNED WITH HIS UPCOMING PET SCAN AND HE STATES THAT HE WILL NOT BE ABLE TO LAY FLAT WITH THAT TEST EITHER.    JANEL STATES HE HAD TAKEN A VALIUM YEARS AGO ON HIS PAST SCANS TO BE ABLE TO DO HIS TEST.   HE STATES IT RELAXED HIM ENOUGH TO BE ABLE TO LAY ON HIS BACK.     JANEL STATES THAT HE IS NOT SURE IF IT WILL HELP HIM THIS TIME BUT ITS WORTH A TRY. HE MAY HAVE TO BE PUT UNDER.    JANEL HAS AN APPT THIS Friday.      PLEASE CALL TO DISCUSS, HE IS NEEDING TO RESCHEDULE THE MRI, AND HE THOUGHT MAYBE DO BOTH SCANS BACK TO BACK IF HE IS ABLE TO GET SOMETHING TO RELAX HIM.

## 2022-02-25 NOTE — TELEPHONE ENCOUNTER
"----- Message from Carli Freedman sent at 2/25/2022  8:49 AM EST -----  Regarding: MARGARITO  Sharif called in this morning stating he was recently diagnosed with anal cancer and that he had large bumps come up on his buttocks. A couple days ago while he was showering stated one busted and a bunch of \"cyst\" like stuff came out of it. He does not want to go to the ER. He would like someone to call him back at [phone].    "

## 2022-02-25 NOTE — TELEPHONE ENCOUNTER
Spoke with patient and he had several complaints. He reports infected cysts on his buttocks that opened and drained this morning in the shower. He also reports poor appetite and has not been eating or drinking well the last few days. He also reports that his pain medication is not working well. He is taking MS Contin 30 mg every 8 hours and oxycodone 5 mg iii tabs every 4-5 hours. He also asked about the referral to wound care clinic. I spoke with Nick in [facility] and she was able to schedule the patient with Arin BENTLEY in wound care for 3/4/22 at 9:15 am. Discussed case with Dr. Peralta who ordered doxycycline 100 mg BID x 10 days, increased MS Contin to 60 mg q8 hours and oxycodone to 10 mg 1-2 every 4-6 hours PRN for breakthrough pain, and Marinol 2.5 mg BID. Patient aware.

## 2022-03-04 NOTE — PROGRESS NOTES
Wound history:  Where is wound: left gluteal    Number of wounds: 1    Clusters: yes    When did it start: 5 months ago    Cause of Wound: anal cancer    Prior treatments: alcohol, vas    Any previous debridements or surgeries:    Prior antibiotics: augementin and doxy    For DFU, surgical shoes or offloading boots: n/a    Patients mobility: W/C or bedbound: wheelchair    Speicalized diet:none    Protein intake: patient states he eats a lot of protein    Prior labs: previously in jan    Prior vascular workup or seeing vascular surgeon: no    Smoker: yes    DM average glucose: n/a    Pharmacy: [facility]    HH: none    DME: [facility]

## 2022-03-10 PROBLEM — C79.51 METASTASIS TO BONE (HCC): Status: ACTIVE | Noted: 2022-01-01

## 2022-03-10 NOTE — PROGRESS NOTES
"NAME: Sharif Looney    : 1965    DATE:  3/11/2022    DIAGNOSIS:   Very Locally Advanced Anal Cancer with involvement of bilateral buttocks as well as metastasis to bone    CHIEF COMPLAINT:  Follow up of Anal Cancer    HISTORY OF PRESENT ILLNESS:   Sharif Looney is a very pleasant 56 y.o. male who is being seen today at the request of Carrington Alexander MD for evaluation and treatment of anal cancer. Mr. Looney says he has a history of recurring cystic lesions / infections which he says occur in multiple areas over his axillae, back, perineum and buttocks.  He reports noticing an area he believed to be a cyst on his L buttock and anal opening about 1 year ago. The area did not rupture and drain like the cysts has had in the past, and continued to grow. It became increasingly large and increasingly painful and says it started to \"leak\" a little bit.  Eventually he was quite uncomfortable and he presented to Dr. Alexander for evaluation.    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 He was then referred to our office for discussion of further treatment options.     Mr. Looney is visibly uncomfortable, lying on his side or standing up to avoid sitting.  He reports feeling the urge to defecate frequently even when he doesn't have any stool to pass.  He denies constipation or liquid stool but says his stools are loose.  He denies bleeding but complains of a lot of pain.  He says he has lost 10-15 lbs over the last 6 months.  His appetite is reduced. He also reports shortness of breath with relatively minimal exertion and says he feels generally weak / fatigued.   He otherwise denies chest pain, focal weakness, incontinence,n/v/c/d or abnormal bleeding.       INTERVAL HISTORY:   Mr. Looney is here today for follow up of anal cancer and PET/CT imaging. He reports he has lost 30 pounds over the last 3 weeks. He " also reports his pain is uncontrolled. He is taking MS Contin 30 mg every 8 hours and oxycodone 10 mg about 12 tabs/day. He had portacath placed with Dr. Alexander. He is eating and drinking relatively well, 1-2 meals/day.  He has been seeing wound care and say she needs more supplies.    PAST MEDICAL HISTORY:  Past Medical History:   Diagnosis Date   • Arthritis    • Cancer (HCC)     anal   • History of herniated intervertebral disc         PAST SURGICAL HISTORY:  Past Surgical History:   Procedure Laterality Date   • BACK SURGERY      discectomy   • PORTACATH PLACEMENT N/A 2/3/2022    Procedure: INSERTION OF PORTACATH;  Surgeon: Carrington Alexander MD;  Location: [facility];  Service: General;  Laterality: N/A;   • TONSILLECTOMY     • TUMOR REMOVAL      knee          FAMILY HISTORY:  Family History   Problem Relation Age of Onset   • Cancer Mother         lung   • Cancer Brother         throat       SOCIAL HISTORY:  Social History     Socioeconomic History   • Marital status: Single   Tobacco Use   • Smoking status: Current Every Day Smoker     Packs/day: 1.00     Types: Cigarettes   • Smokeless tobacco: Never Used   Vaping Use   • Vaping Use: Never used   Substance and Sexual Activity   • Alcohol use: Never   • Drug use: Never   • Sexual activity: Defer     Social History     Social History Narrative   • Not on file   Lives with a roommate  works as a  on a big industrial machine  Current smoker 1ppd  Rarely drinks alcohol, formerly heavy drinker        REVIEW OF SYSTEMS:   A comprehensive 14 point review of systems was performed.  Significant findings as mentioned above.  All other systems reviewed and are negative.      MEDICATIONS:  The current medication list was reviewed in the EMR    Current Outpatient Medications:   •  cetirizine (zyrTEC) 10 MG tablet, Take 1 tablet by mouth Daily., Disp: , Rfl:   •  diazePAM (VALIUM) 5 MG tablet, Take 1 tab 30 min before imaging.  If needed take a second one when you  arrive for the test.  May repeat one additional dose if needed.  Follow these same instructions with the next imaging test., Disp: 6 tablet, Rfl: 0  •  diclofenac (VOLTAREN) 75 MG EC tablet, Take 1 tablet by mouth 2 (Two) Times a Day., Disp: 30 tablet, Rfl: 0  •  dronabinol (Marinol) 2.5 MG capsule, Take 1 capsule by mouth 2 (Two) Times a Day Before Meals., Disp: 60 capsule, Rfl: 0  •  folic acid (FOLVITE) 1 MG tablet, Take 1 tablet by mouth Daily., Disp: 30 tablet, Rfl: 5  •  iron polysaccharides (NIFEREX) 150 MG capsule, Take 1 capsule by mouth Daily., Disp: 30 capsule, Rfl: 3  •  Morphine (MS CONTIN) 15 MG 12 hr tablet, Take 4 tablets by mouth Every 8 (Eight) Hours., Disp: 90 tablet, Rfl: 0  •  oxyCODONE (ROXICODONE) 10 MG tablet, Take 1-2 tablets every 4-6 hours as needed for moderate pain., Disp: 200 tablet, Rfl: 0  •  sennosides-docusate (senna-docusate sodium) 8.6-50 MG per tablet, Take 2 tablets by mouth Daily., Disp: 60 tablet, Rfl: 5  •  tiZANidine (ZANAFLEX) 4 MG tablet, Take 1 tablet by mouth Every 8 (Eight) Hours As Needed for Muscle Spasms., Disp: 30 tablet, Rfl: 0  Zyrtec 10 mg daily     ALLERGIES:    Allergies   Allergen Reactions   • Mupirocin Hives     Pt states this does not sound like the antibiotic that he is  Allergic to       PHYSICAL EXAM:  Vitals:    03/11/22 1358   BP: 104/70   Pulse: (!) 150   Resp: 18   Temp: 96.9 °F (36.1 °C)   SpO2: 96%     Pain Score    03/11/22 1358   PainSc:   9   PainLoc: Buttocks     ECOG score: 0      General:  Awake, alert and oriented, appears chroniclaly ill and appears uncomfortable  HEENT:  Pupils are equal, round and reactive to light and accommodation, Extra-ocular movements full, Oropharyx clear, mucous membranes moist  Neck:  No JVD, thyromegaly or lymphadenopathy  CV:  Regular rate and rhythm, no murmurs, rubs or gallops  Resp:  Lungs are clear to auscultation bilaterally, no crackles  Abd:  Soft, non-tender, non-distended, bowel sounds present, no  organomegaly or masses  Rectal:  He has a very large, firm mass involving the anus.  There is extension of disease to the bilateral  Buttocks.  The mass is most pronounced on the left  With fungating wound present there.    Ext:  No clubbing, cyanosis or edema  Lymph:  No cervical, supraclavicular or axillary LAD.  He has enlarged inguinal LNs  Neuro:  MS as above, CN II-XII intact, grossly non-focal exam      PATHOLOGY:  01-11-22            ENDOSCOPY:  N/a      IMAGING:  NM PET/CT Skull Base to Mid Thigh 02-18-22  PET/CT FINDINGS: The PET and CT images acquired from the base of the  skull to the thoracic inlet showed some muscle uptake from movement  during the incubation period. At the thoracic inlet the thyroid gland  was enlarged. There is intense hypermetabolic activity in the  sternoclavicular joint. On the left there is some bony destructive  change in this region. This is concerning for bony metastatic disease.  The SUV value is 8.5. There is also some increased glucose uptake  diffusely throughout the marrow of the spine. This can be related to  chemotherapy. The lungs were both well aerated. no hypermetabolic  activity or focal lung nodules were demonstrated. There is no increased  glucose uptake in the mediastinum or hilar areas. The liver was  unremarkable and showed no glucose uptake. Spleen was not enlarged.  There were no masses in the adrenal glands. There were no enlarged or  hypermetabolic lymph nodes in the retroperitoneum. The aorta was normal  in caliber. In the lumbar spine there is intense hypermetabolic activity  in the L5 vertebral body consistent with metastatic disease. There are  enlarged lymph nodes in the groin areas bilaterally. The largest lymph  node is on the right with a diameter of 3 cm. This node is intensely  hypermetabolic with an SUV of 7.5. There is muscle uptake in the pelvis  felt to most likely be due to movement during the incubation period.  There are other enlarged  lymph nodes in the left inguinal area that also  show glucose uptake. These nodes measure up to 15 mm in size. There were  no hypermetabolic or enlarged lymph nodes along the iliac lymph node  chains. There is intense hypermetabolic activity in the soft tissues  around the left buttock which is markedly thickened. The skin thickening  measures up to 4 cm. This extends into the perirectal area and into the  soft tissues of the cheek on the right side as well. This area has a  intense hypermetabolic activity with an SUV of 28.6.     IMPRESSION:  Abnormal PET fusion CT showing a large mass involving the  skin surface which is markedly thickened in the perirectal area  extending into both buttocks regions more prominent on the left. This  area has an SUV of 28.6. There is evidence of metastatic disease to the  inguinal lymph nodes. The most intense lymph node in the largest node is  in the right groin. There is also evidence of bony metastatic disease  with intense activity in the L5 vertebral body and in the  sternoclavicular joint on the left side. There is mild bone marrow  activity throughout the spine.      RECENT LABS:  Lab Results   Component Value Date    WBC 17.03 (H) 01/26/2022    HGB 10.6 (L) 01/26/2022    HCT 34.0 (L) 01/26/2022    MCV 86.3 01/26/2022    RDW 15.0 01/26/2022     01/26/2022    NEUTRORELPCT 80.8 (H) 01/26/2022    LYMPHORELPCT 12.5 (L) 01/26/2022    MONORELPCT 5.1 01/26/2022    EOSRELPCT 0.8 01/26/2022    BASORELPCT 0.4 01/26/2022    NEUTROABS 13.76 (H) 01/26/2022    LYMPHSABS 2.13 01/26/2022     Lab Results   Component Value Date     01/26/2022    K 3.4 (L) 01/26/2022    CO2 24.5 01/26/2022     01/26/2022    BUN 8 01/26/2022    CREATININE 0.66 (L) 01/26/2022    EGFRIFNONA 125 01/26/2022    GLUCOSE 100 (H) 01/26/2022    CALCIUM 8.8 01/26/2022    ALKPHOS 104 01/26/2022    AST 11 01/26/2022    ALT 6 01/26/2022    BILITOT 0.2 01/26/2022    ALBUMIN 3.04 (L) 01/26/2022     PROTEINTOT 8.3 01/26/2022     Lab Results   Component Value Date    FERRITIN 323.20 01/26/2022    IRON 10 (L) 01/26/2022    TIBC 253 (L) 01/26/2022    LABIRON 4 (L) 01/26/2022    IGGPJLOV03 442 01/26/2022    FOLATE 4.07 (L) 01/26/2022 01-26-22  HIV-1/ HIV-2   Non-Reactive Non-Reactive            ASSESSMENT & PLAN:  Sharif Looney is a very pleasant 56 y.o. male with newly diagnosed very locally advanced invasive moderately differentiated keratinizing squamous cell carcinoma of the anus involving the bilateral buttocks with metastasis also to a large R inguinal LN as well as to bone (L5 vertebral body, L sternoclavicular joint).    1.  Anal Cancer:  -  He has very locally advanced disease as well as evidence of metastasis as noted on PET.  -  Will cancel MRI as he is not able to tolerate this at this time.  -  We discussed that his cancer is treatable but not curable given bony metastatic spread.  -  Given metastatic disease, I have recommended palliative chemotherapy to try to control his cancer with Carboplatin/Taxol  q21d with growth factor support with Xgeva monthly for bony metastasis (he will need dental clearance for the latter).  We discussed potential risks, benefits and side effects and he would like to proceed.  -  Will work on approvals and schedule chemotherapy teaching appt.  -  May still benefit from radiation for local control but would hold on this for now until his disease is down-sized.    2.  Neoplasm related pain:  -  Increase Morphine to 60 mg q8h.  Continue Oxycodone 10 mg 1-2 tabs q4-6h prn.  -  Continue Senna/Colace IBD with Miralax as needed.    3.  Weight loss:   -  Continue po intake  -  Use Boost to maximize caloric intake.    4.  Prophylaxis:  -  Received 2021 influenza vaccine and Prevnar 13 vaccine.  -  He has had COVID vaccine x 2.  Recommended booster to him.    5.   ACO / DEMI/Other  Quality measures  -  Sharif Looney received 2021 flu vaccine.    -  Sharif Looney reports a pain score  of 9.  Given his pain assessment as noted, treatment options were discussed and the following options were decided upon as a follow-up plan to address the patient's pain: prescription for opiod analgesics  -  Current outpatient and discharge medications have been reconciled for the patient.  Reviewed by: Yesenia Peralta MD     7.  F/u:  - Will work to get Carboplatin/Taxol approved and started as soon as we can get insurance approval.   -  Plan for Xgeva (will need dental clearance first)  - Schedule chemotherapy teaching appointment. T  -  Increase Morphine to 60 mg q8h  -  Continue Oxycodone 10 mg 1-2 tabs q 4-6h prn.  -  Continue Senna/Colace ii BID with Mirilax as needed  -  Continue Niferex and folic acid  -  Supplement oral intake w/ Boost  -  Continue to f/u with wound care    This note was scribed for Yesenia Peralta MD by Adriana Lovett RN.    I, Yesenia Peralta MD, personally performed the services described in this documentation as scribed by the above named individual in my presence, and it is both accurate and complete.  03/11/2022       I spent 40 minutes with Sharif Looney today.  In the office today, more than 50% of this time was spent face-to-face with him  in counseling / coordination of care, reviewing his medical history and counseling on the current treatment plan.  All questions were answered to his satisfaction      Electronically Signed by: Yesenia Peralta MD      CC:   MD Robbie Ramirez, MC Barnhart MD

## 2022-03-11 NOTE — PROGRESS NOTES
Wound Clinic Note  Patient Identification:  Name:  Sharif Looney  Age:  56 y.o.  Sex:  male  :  1965  MRN:  [id number]   Visit Number:  [id number]  Primary Care Physician:  Robbie Tom APRN     Subjective     Chief complaint:     Gluteal wound    History of presenting illness:     Patient is a 56 y.o. male with past medical history significant for anal cancer, and current everyday smoker. Presented today for evaluation of left gluteal wound. Reports area developed approximately 5 months ago. He states he develops skin cysts all his life. He has been diagnosed with anal cancer. He reports burning to the area.  He has been cleaning the area with alcohol and applying vaseline gauze. He did have biospy of the area confirming carcinoma. He is following with oncology. Plans for concurrent treatment with chemo and radiation, this is being worked up by oncology team at this time. Reports decreased in appetite. Reports drainage from left gluteal wound and multiple open areas to low back. Denies any fever or chills. He has been taking Doxycyline. Current everyday smoker.    ---------------------------------------------------------------------------------------------------------------------   Review of Systems   Constitutional: Positive for appetite change. Negative for chills and fever.   HENT: Negative for congestion and rhinorrhea.    Eyes: Negative for pain and redness.   Respiratory: Positive for cough. Negative for shortness of breath.    Cardiovascular: Negative for chest pain and leg swelling.   Gastrointestinal: Negative for nausea and vomiting.   Endocrine: Negative for heat intolerance.   Genitourinary: Negative for difficulty urinating and frequency.   Musculoskeletal: Positive for gait problem.   Skin: Positive for wound.   Neurological: Negative for dizziness and weakness.   Hematological: Does not bruise/bleed easily.   Psychiatric/Behavioral: Negative for confusion. The patient is not  nervous/anxious.      ---------------------------------------------------------------------------------------------------------------------   Past Medical History:   Diagnosis Date   • Arthritis    • Benign tumor 1982    knee   • Broken ribs    • Cancer (HCC)     anal   • Chronic bilateral low back pain without sciatica    • Degeneration of lumbar or lumbosacral intervertebral disc    • Generalized skin cysts    • History of herniated intervertebral disc    • Shoulder pain      Past Surgical History:   Procedure Laterality Date   • BACK SURGERY      discectomy   • PORTACATH PLACEMENT N/A 2/3/2022    Procedure: INSERTION OF PORTACATH;  Surgeon: Carrington Alexander MD;  Location[facility];  Service: General;  Laterality: N/A;   • TONSILLECTOMY     • TUMOR REMOVAL      knee     Family History   Problem Relation Age of Onset   • Cancer Mother         lung   • Cancer Sister         esophageal   • Cancer Brother         throat   • Cancer Maternal Aunt         pancreatic   • Cancer Maternal Uncle         unknown type     Social History     Socioeconomic History   • Marital status: Single   Tobacco Use   • Smoking status: Current Every Day Smoker     Packs/day: 1.00     Types: Cigarettes   • Smokeless tobacco: Never Used   Vaping Use   • Vaping Use: Never used   Substance and Sexual Activity   • Alcohol use: Never   • Drug use: Never   • Sexual activity: Defer     ---------------------------------------------------------------------------------------------------------------------   Allergies:  Mupirocin  ---------------------------------------------------------------------------------------------------------------------  Objective     ---------------------------------------------------------------------------------------------------------------------   Vital Signs:     No data found.  There were no vitals filed for this visit.     on   ;      There is no height or weight on file to calculate BMI.  Wt Readings from Last 3  Encounters:   02/03/22 69.2 kg (152 lb 9.6 oz)   01/27/22 69.4 kg (153 lb)   01/26/22 69.5 kg (153 lb 3.2 oz)     ---------------------------------------------------------------------------------------------------------------------   Physical Exam  Constitutional: Vital sign were reviewed (temperature, pulse, respiration, and blood pressure) and found to be within expected limits, general appearance was assessed and the patient was found to be in moderate distress and calm and comfortable appears  Eyes:lids and lashes normal, pupils equal, round, reactive to light  Ears, Nose, Mouth, Throat:hearing intact and neck normal  Neck: shows normal range of motion without pain, and no evidence of trauma or deformity  and trachea is midline   Respiratory: Normal respiratory effort and symmetrical chest expansion  Cardiovascular:pulses normal, and intact distal pulses dorsal-pedis  palpable and posteria-tib   palpable2+ lower bilateral  Gastrointestinal:no masses and no tenderness  Musculoskeletal: inspection of digits and nails shows normal findings  and no edema   Neurologic: Mental Status orientated to person, place, time and situation, Speech normal content and execusion  Psychiatric: Normal.  Skin: Temperature:normal turgor and temperatureColor: normal, no cyanosis, jaundice, pallor or bruising, Moisture: dry,Nails: thickened yellow toenails bed, Hair:thinning to lower extremities .  Left gluteal- large area with, yellow/gray sough, scattered areas of eschar. Small amount of drainage. Edges open irregular. Tender to touch  Small open areas noted to low back, area to left aspect drainage.   ---------------------------------------------------------------------------------------------------------------------   Wound Culture   Date Value Ref Range Status   03/04/2022 Moderate growth (3+) Streptococcus gordonii (A)  Final   03/04/2022 Scant growth (1+) Normal Skin Tati  Final     No results found for: STOOLCX  No results  found for: RESPCX  No results found for: MRSACX  Pain Management Panel    There is no flowsheet data to display.       I have personally reviewed the above laboratory results.     Assessment & Plan      Anal Cancer- hydrogel to wound bed. Cover with ABD pad.     Wound culture was obtained.     Current everyday smoker- Tissue perfusion is lower in users of tobacco and other forms of nicotine. Reduced tissue perfusion strongly inhibits wound healing, increases risk of infection, and dramatically increases risk of limb loss.    Follow up 1 month    MC Miller   [facility]  03/04/2022  [id number]

## 2022-03-15 NOTE — PROGRESS NOTES
CHEMOTHERAPY PREPARATION    Sharif Looney  [id number]  1965  03/15/2022     Chief Complaint: chemo education     History of present illness:  Sharif Looney is a 56 y.o.  male who is here today for chemotherapy preparation and needs assessment. 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 Treatment with n0zyksvu Carboplatin/Taxol with growth factor and Xgeva was recommended. He complains of pain which makes it difficult to sit and currently lying on his side. However, he says his pain is better controlled with current pain medications. His appetite has also recently improved. He is needing more pads for dressing changes at home and says he has contacted wound care. He has no other specific complaints at this time.     Oncology History:    Oncology/Hematology History   Anal squamous cell carcinoma (HCC)   1/11/2022 Initial Diagnosis    Anal squamous cell carcinoma (HCC)     3/14/2022 -  Chemotherapy    OP ANAL PACLitaxel / CARBOplatin (Q21D)     3/14/2022 -  Chemotherapy    OP SUPPORTIVE Denosumab (Xgeva) Q28D     3/14/2022 -  Chemotherapy    Rocephin IV     Metastasis to bone (HCC)   3/10/2022 Initial Diagnosis    Metastasis to bone (HCC)     3/14/2022 -  Chemotherapy    OP ANAL PACLitaxel / CARBOplatin (Q21D)     3/14/2022 -  Chemotherapy    OP SUPPORTIVE Denosumab (Xgeva) Q28D         Past Medical History:   Diagnosis Date   • Arthritis    • Benign tumor 1982    knee   • Broken ribs    • Cancer (HCC)     anal   • Chronic bilateral low back pain without sciatica    • Degeneration of lumbar or lumbosacral intervertebral disc    • Generalized skin cysts    • History of herniated intervertebral disc    • Shoulder pain        Past Surgical History:   Procedure Laterality Date   • BACK SURGERY      discectomy   • PORTACATH PLACEMENT N/A 2/3/2022    Procedure: INSERTION OF PORTACATH;  Surgeon: Carrington Alexander MD;  Location: [facility];  Service: General;   Laterality: N/A;   • TONSILLECTOMY     • TUMOR REMOVAL      knee       Family History   Problem Relation Age of Onset   • Cancer Mother         lung   • Cancer Sister         esophageal   • Cancer Brother         throat   • Cancer Maternal Aunt         pancreatic   • Cancer Maternal Uncle         unknown type       Medications: The current medication list was reviewed and reconciled.     Allergies:  is allergic to mupirocin.    Review of Systems:   A comprehensive 14 point review of systems was performed.  Significant findings as mentioned above.  All other systems reviewed and are negative.      Review of Systems    Physical Exam:    Vitals:    03/15/22 1307   BP: 119/73   Pulse: 118   Resp: 18   Temp: 97.5 °F (36.4 °C)   SpO2: 94%     Vitals:    03/15/22 1307   PainSc: 10-Worst pain ever   PainLoc: Buttocks          ECOG: (1) Restricted in Physically Strenuous Activity, Ambulatory & Able to Do Work of Light Nature  General:  Awake, alert and oriented, appears uncomfortable and therefore, lying on his side on clinic bed.   HEENT:  Pupils are equal, round and reactive to light and accommodation, Extra-ocular movements full, Oropharyx clear, mucous membranes moist  Neck:  No JVD, thyromegaly or lymphadenopathy  CV:  Regular tachycardica, no murmurs, rubs or gallops  Resp:  Lungs are clear to auscultation bilaterally  Abd:  Soft, non-tender, non-distended, bowel sounds present, no organomegaly or masses  Ext:  No clubbing, cyanosis or edema  Neuro:  MS as above, grossly non-focal exam           NEEDS ASSESSMENTS    Genetics  The patient's new diagnosis and family history have been reviewed for genetic counseling needs. A genetic referral is not recommended.     Barriers to care  The patient was given the name and card for our Oncology Social Worker, Carlene Her who will meet with him on first treatment day.     VAD Assessment  The patient and I discussed planned intervenous chemotherapy as well as other IV treatments  "that are often needed throughout the course of treatment. These may include, but are not limited to blood transfusions, antibiotics, and IV hydration. The vasculature does not appear to be adequate for multiple peripheral IVs throughout their treatment course. Discussed risks and benefits of VADs. The patient would like to pursue Port-A-Cath insertion prior to initiation of treatment. This was placed by Dr. Alexander.     Advanced Care Planning  The patient and I discussed advanced care planning, \"Conversations that Matter\".   This service was offered, free of charge, for development of advance directives with a certified ACP facilitator.  The patient does not have an up-to-date advanced directive. This document is not on file with our office. The patient is not interested in an appointment with one of our facilitators to create or update their advanced directives.      Palliative Care  The patient and I discussed palliative care services. Palliative care is not the same as Hospice care. This is specialized medical care for people living with serious illness with the goal of improving quality of life for the patient and their family. [facility] has partnered with [facility] to offer our patients outpatient palliative care early along with their treatment to assist in coordination of care, symptom management, pain management, and medical decision making.  Oncology criteria for palliative care referral is not met at this time. The patient is not interested in a palliative care consultation.     Additional Referral needs  none      CHEMOTHERAPY EDUCATION    Booklets Given: Chemotherapy and You [x]  Eating Hints [x]    Sexuality/Fertility Books []      Chemotherapy/Biotherapy Education Sheets: (list all that apply)  nausea management, acid reflux management, diarrhea management, Cancer resourse contacts information, skin and mouth care, vaccination information and Treatment Calendar                              "                                                                                                                                    Chemotherapy Regimen:   Treatment Plans     Name Type Plan Dates Plan Provider         Active    OP SUPPORTIVE Denosumab (Xgeva) Q28D ONCOLOGY SUPPORTIVE CARE 1  3/14/2022 - Present Yesenia Peralta MD     OP ANAL PACLitaxel / CARBOplatin (Q21D) ONCOLOGY TREATMENT  3/13/2022 - Present Yesenia Peralta MD                    TOPICS EDUCATION PROVIDED COMMENTS   ANEMIA:  role of RBC, cause, s/s, ways to manage, role of transfusion [x]    THROMBOCYTOPENIA:  role of platelet, cause, s/s, ways to prevent bleeding, things to avoid, when to seek help [x]    NEUTROPENIA:  role of WBC, cause, infection precautions, s/s of infection, when to call MD [x]    NUTRITION & APPETITE CHANGES:  importance of maintaining healthy diet & weight, ways to manage to improve intake, dietary consult, exercise regimen [x]    DIARRHEA:  causes, s/s of dehydration, ways to manage, dietary changes, when to call MD [x]    CONSTIPATION:  causes, ways to manage, dietary changes, when to call MD [x]    NAUSEA & VOMITING:  cause, use of antiemetics, dietary changes, when to call MD [x]    MOUTH SORES:  causes, oral care, ways to manage [x]    ALOPECIA:  cause, ways to manage, resources [x]    INFERTILITY & SEXUALITY:  causes, fertility preservation options, sexuality changes, ways to manage, importance of birth control [x]    NERVOUS SYSTEM CHANGES:  causes, s/s, neuropathies, cognitive changes, ways to manage [x]    PAIN:  causes, ways to manage [x]    SKIN & NAIL CHANGES:  cause, s/s, ways to manage [x]    ORGAN TOXICITIES:  cause, s/s, need for diagnostic tests, labs, when to notify MD [x]    SURVIVORSHIP:  distress, distress assessment, secondary malignancies, early/late effects, follow-up, social issues, social support [x]    HOME CARE:  use of spill kits, storing of PO chemo, how to manage bodily fluids [x]     MISCELLANEOUS:  drug interactions, administration, vesicant, et [x]        Assessment and Plan:    1. Anal squamous cell carcinoma  2. Metastasis to bone    The patient and I have reviewed their new cancer diagnosis and scheduled treatment plan. Needs assessment was completed including genetics,  barriers to care, VAD evaluation, advanced care planning, and palliative care services. Referrals have been ordered as appropriate based upon our evaluation and patient desires.     Chemotherapy teaching was also completed today as documented above. Adequate time was given to answer all questions to his satisfaction. Patient is aware of their care team members and contact information if they have questions or problems throughout the treatment course. Needs assessments and education has been completed. The patient is adequately prepared to begin treatment pending insurance approvals. We also discussed the need for dental clearance (especially given poor dentition) prior to starting Xgeva.     Reviewed with patient education regarding EMLA cream, Zyprexa, Zofran and Compazine prescriptions sent to pharmacy.       I advised the patient to continue with current pain medications for aches/pains related to cancer/treatment. Will monitor and adjust as needed. I also advised of using Senakot or Miralax as needed for constipation or Imodium as needed for diarrhea.       I reviewed with the patient the care team members. I also reviewed the option of the acute care visits provided through our oncology office for evaluation and management of symptoms related to treatment. Patient was provided with phone number to call during regular office hours [phone] press #1 and for treatment related questions call [phone] to speak to a nurse. If after hours or on the weekend call [phone] and ask to page the MD on call for [facility] Oncology services.       I spent 50 minutes with Sharif Looney today.  In the office today, more than  50% of this time was spent face-to-face with him  in counseling / coordination of care, reviewing his interim medical history and counseling on the current treatment plan.  All questions were answered to his satisfaction.        Electronically Signed by: MC Castro , March 15, 2022 13:09 EDT

## 2022-03-21 NOTE — TELEPHONE ENCOUNTER
Caller: Sharif Looney    Relationship: Self    Best call back number: [phone]    Requested Prescriptions:   Requested Prescriptions     Pending Prescriptions Disp Refills   • oxyCODONE (ROXICODONE) 10 MG tablet 200 tablet 0     Sig: Take 1-2 tablets every 4-6 hours as needed for moderate pain.        Pharmacy where request should be sent: [facility] [address] [place] [address], [place] [address] [facility] [address] [facility] [address] [facility] [phone] [facility] [phone] FX     Additional details provided by patient: PT WILL RUN OUT OF MEDICATION TOMORROW    Does the patient have less than a 3 day supply:  [x] Yes  [] No

## 2022-03-25 NOTE — PROGRESS NOTES
"SS received call from Adriana Lovett.    SS contacted patient [phone] who states that he will be here tomorrow, Tuesday, March 22 nd at 2:00 for Infusion Clinic.      Patient lives at home with friend  Susie.    Patient doesn't utilize any home health.    Patient has Rolator with wheels.    Patient doesn't have children.    Patient states that he needs assistance with electric bill.  Electric supplied through [employer].  Patient states that it is worked out until April 6.    Patient states that he doesn't have income and friend hasn't been able to work due to health issues. Patient states that he was working at [employer].    Patient working on social security disability.    Advance Care Planning:  The patient and I discussed care planning \"Conversations that Matter.\" This service was offered, free of charge, for development of advance directives with a certified ACP facilitator. The patient does not have an up-to-date advance directive. The patient is not interested in an appointment with one of our facilitators to create or update their advanced directives.    Patient receiving IV antibiotics for buttock wound.    SS spoke with infusion clinic nurses Samantha and Lorrie.  Patient is able to make some appointments and some he doesn't.    SS plans to meet with patient at next infusion visit.    SS will follow.             "

## 2022-03-25 NOTE — PROGRESS NOTES
SS contacted patient [phone] who states that he worked out his electric bill. Patient states that he is feeling some better.  He feels that his wound is healing.  Patient isn't able to come daily for IV antibiotics due to feeling bad.  SS provided patient with 's name and phone number to contact with any questions or assistance needed.    SS contacted Infusion Clinic ext [phone] per Lilliana to make aware that patient doesn't plan to come to infusion clinic over the weekend and that it will be Monday, March 28 th before he will be here for antibiotics.     SS will follow.

## 2022-03-28 NOTE — PROGRESS NOTES
SS met with patient, friend Rhiannon Iniguez, and Adriana Murphy this date in chemo office visit room.  Patient receiving IV antibiotics today in Infusion Clinic.  Patient scheduled to begin chemo in the a.m.    Patient lives at home with friend, Rhiannon.  Patient diagnosed with Anal Squamous Cell Carcinoma.  Patient coming daily for IV antibiotics for buttock wound.  Patient hasn't been compliant with appointments, therefore creating concerns regarding chemo treatments.    Patient doesn't utilize any home health or durable medical equipment.    Patient's friend concerned about patient receiving chemo due to living environment.  Rhiannon states that patient will not bath and doesn't change dressing as scheduled.    Friend, Rhiannon is employed at [employer] and states that she hasn't been able to work. Rhiannon concerned they will lose their house if she isn't able to work.     Friend request information on nursing facilities. SS explained to her that if patient is admitted to nursing facility that he will not be able to receive chemotherapy.    Friend states that patient doesn't tell her anything.  Patient states that he is agreeable to putting her name on HIPPA form.    Patient did complete HIPPA form with Rhiannon Iniguez [phone] listed.  Patient has no children and no family locally.    Rhiannon agreeable to come with patient tomorrow to first chemo so that she can learn what to expect during chemotherapy and how to assist with patient's care.    Patient asked if he wants to do chemo and patient states that he does and that he will be compliant.    Patient currently has no income.  Patient states that he applied for his social security but hasn't received it.  SS asked patient to bring phone number for representative tomorrow to chemo treatment and  will attempt to assist with contacting social security office.    Patient utilizes [employer] for transportation.  Rhiannon to bring patient in the morning and  will  arrange [facility] to transport patient home from appointment tomorrow.    Patient provided patient with chocolate boost this date.    SS will meet with patient and friend in the morning at first chemo.    SS will follow.

## 2022-03-29 NOTE — PROGRESS NOTES
"Patient is 55 Y/O white male diagnosed with Anal Cancer metastasis to bone.  SS met with patient and friend Rhiannon Iniguez, Monday, March 28 th, in infusion clinic.  SS present with patient and significant other Rhiannon, today in chemo suite.  Patient receiving his first chemo today.    Patient lives at home with significant other Rhiannon.  Rhiannon and patient state that they are more than friends, they love each other, and have been friends for 45 years. Rhiannon states that she is having a difficult time with situation and doesn't think that she can provide care for patient at home. Patient is limited and has no other support system.  Patient continues to want to receive chemo treatment, even though it is a difficult situation.    Advance Care Planning:  The patient and I discussed care planning \"Conversations that Matter.\" This service was offered, free of charge, for development of advance directives with a certified ACP facilitator. The patient does not have an up-to-date advance directive. The patient is not interested in an appointment with one of our facilitators to create or update their advanced directives.     Distress Screening Follow-up    Diagnosis:  Anal Cancer    Location of Distress Screening: Medical Oncology    Distress Level: 9 (Notifed Carlene,  of Distress Score) (3/29/2022 10:50 AM)    Physical Concerns:    Appearance: No  Bathing/Dressing: Yes  Breathing: No  Changes in urination: No  Constipation: No  Diarrhea: No  Eating: No  Fatigue: No  Feeling swollen: No  Fevers: No  Getting around: Yes  Indigestion: No  Memory/Concentration: No  Mouth sores: No  Nausea: No  Nose dry/congested: No  Pain: No  Sexual: No  Skin dry/itchy: No  Sleep: No  Substance abuse: No  Tingling hands/feet: No    Practical Problems:    : No  Food: No  Housing: Yes  Insurance/Financial: No  Transportation: Yes  Work/School: No  Treatment decisions: No  Practical Concerns Comments: n    Emotional " Concerns:    Depression: No  Nervousness: No  Sadness: No  Worry: Yes  Loss of Interest/Activities: No  Emotional Concerns Comment: No    Family Concerns:    Dealing with children: No  Dealing with partner: No  Ability to have children: No  Family health issues: No  Family Concerns Comment: No    Spiritual Concerns:    Spiritual/Mandaeism Concerns: No     Interventions: SS provided patient and friend Rhiannon with Behavioral Health information.  Both decline services at this time.     Significant other states that she needs to work and will not be able to come with patient to appointment tomorrow.  SS contacted [facility] [phone] per Disha to arrange for [facility] to pick patient up at 2:30 and bring to oncology appointment at 3:00.  Confirmation #[id number] for [facility].    Patient, friend Rhiannon, nurse Neema, and  discussed nursing home placement.  SS explained to patient and friend that patient can not receive chemotherapy if admitted to nursing home.  Patient states that he wants to receive chemo.  Rhiannon agreeable to patient receiving chemo and utilizing [facility] for her to be able to continue to work.    SS will follow.

## 2022-03-30 NOTE — PROGRESS NOTES
Patient in clinic today for injection and IV antibiotics.    SS spoke with significant other Rhiannon Iniguez [phone] via telephone.  Rhiannon states that patient did come to appointment today via [facility].    Rhiannon states that she and patient had a good conversation this morning and that patient was different than he had been. She still feels that he is slightly confused.  Rhiannon states that patient did not know that he was terminally ill and it broke her heart to tell him.  Rhiannon states that she has thrown up all day due to the smell of the wound.  Rhiannon states that she didn't go to work today because she didn't want to leave him.  Rhiannon states that she attemped to contact patient's family via telephone.  Rhiannon states that no one answered so she sent a message through messenger.  Rhiannon states that she did get some response.    Rhiannon states that she is still uncertain about what to do and how to take care of patient.  SS offered to arrange hospital bed for patient. Rhiannon states that she will let  know if she decides to have  to arrange hospital bed.    SS spoke with FELIBERTO Andrew in Infusion Clinic who states that patient will be back tomorrow for his last IV antibiotic treatment.  Lorrie states that she arranged [facility] for patient.    Rhiannon provided SS with patient's social  Leona [phone] Case ID # [id number].    SS will follow as needed.

## 2022-04-05 NOTE — PROGRESS NOTES
SS contacted patient [phone] who states that he is doing a lot better.  Patient's significant other, Rhiannon, to start working from home tomorrow.  Patient states that this will help both of them.    Patient asked about appointment times.  Patient is scheduled with [facility] on Thursday, April 7 th for labs at 9:00 and [facility] at 9:30.    SS will follow.

## 2022-04-07 NOTE — PROGRESS NOTES
"NAME: Sharif Looney    : 1965    DATE:  2022    DIAGNOSIS:   Very Locally Advanced Anal Cancer with involvement of bilateral buttocks as well as metastasis to bone    TREATMENT:        CHIEF COMPLAINT:  Follow up of Anal Cancer/toxicity check     HISTORY OF PRESENT ILLNESS:   Sharif Looney is a very pleasant 56 y.o. male who is being seen today at the request of Carrington Alexander MD for evaluation and treatment of anal cancer. Mr. Looney says he has a history of recurring cystic lesions / infections which he says occur in multiple areas over his axillae, back, perineum and buttocks.  He reports noticing an area he believed to be a cyst on his L buttock and anal opening about 1 year ago. The area did not rupture and drain like the cysts has had in the past, and continued to grow. It became increasingly large and increasingly painful and says it started to \"leak\" a little bit.  Eventually he was quite uncomfortable and he presented to Dr. Alexander for evaluation.    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 He was then referred to our office for discussion of further treatment options.     Mr. Looney is visibly uncomfortable, lying on his side or standing up to avoid sitting.  He reports feeling the urge to defecate frequently even when he doesn't have any stool to pass.  He denies constipation or liquid stool but says his stools are loose.  He denies bleeding but complains of a lot of pain.  He says he has lost 10-15 lbs over the last 6 months.  His appetite is reduced. He also reports shortness of breath with relatively minimal exertion and says he feels generally weak / fatigued.   He otherwise denies chest pain, focal weakness, incontinence,n/v/c/d or abnormal bleeding.       INTERVAL HISTORY:   Mr. Looney is here today for follow up of anal cancer/toxicity check. He was recently started on " "Carbo/Taxol and has completed his first cycle. Overall, he reports tolerating this very well with fatigue as his only noticeable side effect. Yesterday, he reports feeling like \"1 million dollars\" but is more fatigued today. He continues with ongoing rectal pain but feels like this improved slightly following his first treatment. He continues to take MS Contin 60 mg every 8 hours and oxycodone 10 mg ~2 tablets, three times/day. He says he is eating and hydrating well. He is also drinking boost. He continues to follow with wound care. He has no other complaints today.     PAST MEDICAL HISTORY:  Past Medical History:   Diagnosis Date   • Arthritis    • Benign tumor 1982    knee   • Broken ribs    • Cancer (HCC)     anal   • Chronic bilateral low back pain without sciatica    • Degeneration of lumbar or lumbosacral intervertebral disc    • Generalized skin cysts    • History of herniated intervertebral disc    • Shoulder pain         PAST SURGICAL HISTORY:  Past Surgical History:   Procedure Laterality Date   • BACK SURGERY      discectomy   • PORTACATH PLACEMENT N/A 2/3/2022    Procedure: INSERTION OF PORTACATH;  Surgeon: Carrington Alexander MD;  Location: [facility];  Service: General;  Laterality: N/A;   • TONSILLECTOMY     • TUMOR REMOVAL      knee          FAMILY HISTORY:  Family History   Problem Relation Age of Onset   • Cancer Mother         lung   • Cancer Sister         esophageal   • Cancer Brother         throat   • Cancer Maternal Aunt         pancreatic   • Cancer Maternal Uncle         unknown type       SOCIAL HISTORY:  Social History     Socioeconomic History   • Marital status: Single   Tobacco Use   • Smoking status: Current Every Day Smoker     Packs/day: 1.00     Types: Cigarettes   • Smokeless tobacco: Never Used   Vaping Use   • Vaping Use: Never used   Substance and Sexual Activity   • Alcohol use: Never   • Drug use: Never   • Sexual activity: Defer     Social History     Social History Narrative "   • Not on file   Lives with a roommate  works as a  on a big industrial machine  Current smoker 1ppd  Rarely drinks alcohol, formerly heavy drinker        REVIEW OF SYSTEMS:   A comprehensive 14 point review of systems was performed.  Significant findings as mentioned above.  All other systems reviewed and are negative.      MEDICATIONS:  The current medication list was reviewed in the EMR    Current Outpatient Medications:   •  cetirizine (zyrTEC) 10 MG tablet, Take 1 tablet by mouth Daily., Disp: , Rfl:   •  diazePAM (VALIUM) 5 MG tablet, Take 1 tab 30 min before imaging.  If needed take a second one when you arrive for the test.  May repeat one additional dose if needed.  Follow these same instructions with the next imaging test., Disp: 6 tablet, Rfl: 0  •  diclofenac (VOLTAREN) 75 MG EC tablet, Take 1 tablet by mouth 2 (Two) Times a Day., Disp: 30 tablet, Rfl: 0  •  dronabinol (Marinol) 2.5 MG capsule, Take 1 capsule by mouth 2 (Two) Times a Day Before Meals., Disp: 60 capsule, Rfl: 0  •  folic acid (FOLVITE) 1 MG tablet, Take 1 tablet by mouth Daily., Disp: 30 tablet, Rfl: 5  •  iron polysaccharides (NIFEREX) 150 MG capsule, Take 1 capsule by mouth Daily., Disp: 30 capsule, Rfl: 3  •  lidocaine-prilocaine (EMLA) 2.5-2.5 % cream, Apply to port a cath ~30-45 min prior to chemotherapy, Disp: 30 g, Rfl: 3  •  Morphine (MS CONTIN) 60 MG 12 hr tablet, Take 1 tablet by mouth Every 8 (Eight) Hours., Disp: 90 tablet, Rfl: 0  •  OLANZapine (zyPREXA) 5 MG tablet, Take 1 tablet by mouth Every Night. Take on days 2, 3 and 4 after chemotherapy., Disp: 3 tablet, Rfl: 5  •  ondansetron (ZOFRAN) 8 MG tablet, Take 1 tablet by mouth 3 (Three) Times a Day As Needed for Nausea or Vomiting., Disp: 30 tablet, Rfl: 5  •  oxyCODONE (ROXICODONE) 10 MG tablet, Take 1-2 tablets every 4-6 hours as needed for moderate pain., Disp: 200 tablet, Rfl: 0  •  prochlorperazine (COMPAZINE) 10 MG tablet, Take 1 tablet by mouth Every 6 (Six)  Hours As Needed for Nausea., Disp: 60 tablet, Rfl: 3  •  sennosides-docusate (senna-docusate sodium) 8.6-50 MG per tablet, Take 2 tablets by mouth Daily., Disp: 60 tablet, Rfl: 5  •  tiZANidine (ZANAFLEX) 4 MG tablet, Take 1 tablet by mouth Every 8 (Eight) Hours As Needed for Muscle Spasms., Disp: 30 tablet, Rfl: 0  •  potassium chloride (K-DUR,KLOR-CON) 20 MEQ CR tablet, Take 1 tablet by mouth 2 (Two) Times a Day for 3 days., Disp: 6 tablet, Rfl: 0  No current facility-administered medications for this visit.    Facility-Administered Medications Ordered in Other Visits:   •  heparin 100 UNIT/ML injection  - ADS Override Pull, , , ,   Zyrtec 10 mg daily     ALLERGIES:    Allergies   Allergen Reactions   • Mupirocin Hives     Pt states this does not sound like the antibiotic that he is  Allergic to       PHYSICAL EXAM:  Vitals:    04/07/22 0906   BP: (!) 86/61   Pulse: (!) 160   Resp: 18   Temp: 96.9 °F (36.1 °C)   SpO2: 99%     Pain Score    04/07/22 0906   PainSc:   9     ECOG score: 1   HR Recheck 120   General:  Awake, alert and oriented, in no acute distress. Chronically uncomfortable.   HEENT:  Pupils are equal, round and reactive to light and accommodation, Extra-ocular movements full, Oropharyx clear, mucous membranes moist  Neck:  No JVD, thyromegaly or lymphadenopathy  CV:  Regular tachycardia, no murmurs, rubs or gallops  Resp:  Lungs are clear to auscultation bilaterally  Abd:  Soft, non-tender, non-distended, bowel sounds present, no organomegaly or masses  Ext:  No clubbing, cyanosis or edema  Neuro:  MS as above, grossly non-focal exam    PATHOLOGY:  01-11-22      ENDOSCOPY:  N/a      IMAGING:  NM PET/CT Skull Base to Mid Thigh 02-18-22  PET/CT FINDINGS: The PET and CT images acquired from the base of the  skull to the thoracic inlet showed some muscle uptake from movement  during the incubation period. At the thoracic inlet the thyroid gland  was enlarged. There is intense hypermetabolic activity in  the  sternoclavicular joint. On the left there is some bony destructive  change in this region. This is concerning for bony metastatic disease.  The SUV value is 8.5. There is also some increased glucose uptake  diffusely throughout the marrow of the spine. This can be related to  chemotherapy. The lungs were both well aerated. no hypermetabolic  activity or focal lung nodules were demonstrated. There is no increased  glucose uptake in the mediastinum or hilar areas. The liver was  unremarkable and showed no glucose uptake. Spleen was not enlarged.  There were no masses in the adrenal glands. There were no enlarged or  hypermetabolic lymph nodes in the retroperitoneum. The aorta was normal  in caliber. In the lumbar spine there is intense hypermetabolic activity  in the L5 vertebral body consistent with metastatic disease. There are  enlarged lymph nodes in the groin areas bilaterally. The largest lymph  node is on the right with a diameter of 3 cm. This node is intensely  hypermetabolic with an SUV of 7.5. There is muscle uptake in the pelvis  felt to most likely be due to movement during the incubation period.  There are other enlarged lymph nodes in the left inguinal area that also  show glucose uptake. These nodes measure up to 15 mm in size. There were  no hypermetabolic or enlarged lymph nodes along the iliac lymph node  chains. There is intense hypermetabolic activity in the soft tissues  around the left buttock which is markedly thickened. The skin thickening  measures up to 4 cm. This extends into the perirectal area and into the  soft tissues of the cheek on the right side as well. This area has a  intense hypermetabolic activity with an SUV of 28.6.     IMPRESSION:  Abnormal PET fusion CT showing a large mass involving the  skin surface which is markedly thickened in the perirectal area  extending into both buttocks regions more prominent on the left. This  area has an SUV of 28.6. There is evidence of  metastatic disease to the  inguinal lymph nodes. The most intense lymph node in the largest node is  in the right groin. There is also evidence of bony metastatic disease  with intense activity in the L5 vertebral body and in the  sternoclavicular joint on the left side. There is mild bone marrow  activity throughout the spine.      RECENT LABS:  Lab Results   Component Value Date    WBC 11.48 (H) 04/07/2022    HGB 8.3 (L) 04/07/2022    HCT 27.5 (L) 04/07/2022    MCV 81.8 04/07/2022    RDW 17.2 (H) 04/07/2022     04/07/2022    NEUTRORELPCT 85.0 (H) 03/28/2022    LYMPHORELPCT 9.6 (L) 03/28/2022    MONORELPCT 4.0 (L) 03/28/2022    EOSRELPCT 0.3 03/28/2022    BASORELPCT 0.2 03/28/2022    NEUTROABS 8.50 (H) 04/07/2022    LYMPHSABS 2.40 03/28/2022     Lab Results   Component Value Date     (L) 04/07/2022    K 3.2 (L) 04/07/2022    CO2 26.5 04/07/2022    CL 92 (L) 04/07/2022    BUN 10 04/07/2022    CREATININE 0.58 (L) 04/07/2022    EGFRIFNONA 125 01/26/2022    GLUCOSE 138 (H) 04/07/2022    CALCIUM 10.1 04/07/2022    ALKPHOS 146 (H) 04/07/2022    AST 26 04/07/2022    ALT 28 04/07/2022    BILITOT 0.4 04/07/2022    ALBUMIN 2.29 (L) 04/07/2022    PROTEINTOT 7.4 04/07/2022    MG 1.9 03/28/2022    PHOS 2.7 03/28/2022     Lab Results   Component Value Date    FERRITIN 323.20 01/26/2022    IRON 10 (L) 01/26/2022    TIBC 253 (L) 01/26/2022    LABIRON 4 (L) 01/26/2022    IEHJJRWD51 442 01/26/2022    FOLATE 4.07 (L) 01/26/2022 01-26-22  HIV-1/ HIV-2   Non-Reactive Non-Reactive            ASSESSMENT & PLAN:  Sharif Looney is a very pleasant 56 y.o. male with newly diagnosed very locally advanced invasive moderately differentiated keratinizing squamous cell carcinoma of the anus involving the bilateral buttocks with metastasis also to a large R inguinal LN as well as to bone (L5 vertebral body, L sternoclavicular joint).    1.  Anal Cancer:  -  He has very locally advanced disease as well as evidence of metastasis as  noted on PET.  -  Cancelled MRI as he is not able to tolerate this at this time.  -  We discussed that his cancer is treatable but not curable given bony metastatic spread.  -  Given metastatic disease, recommended palliative chemotherapy to try to control his cancer with Carboplatin/Taxol  q21d with growth factor support with Xgeva monthly for bony metastasis.  We discussed potential risks, benefits and side effects and he would like to proceed.  -He has completed his first cycle and overall tolerated this well. We again discussed the need for dental clearance prior to starting Xgeva.   -Will continue with treatment. He will follow up with MD with C2 as scheduled.     2.  Neoplasm related pain:  -  Slightly improved following his first treatment. Continue MSContin 60 mg q8h.  Continue Oxycodone 10 mg 1-2 tabs q4-6h prn (taking ~2 tabs TID).   -  Continue Senna/Colace ii BID with Miralax as needed.    3.  Weight loss:   - Continue to use Boost to maximize caloric intake. He has been gaining weight recently. Continue to push oral hydration.     4. Hypokalemia:  -Denies having diarrhea. Rx for oral potassium replacement provided today.     5. Folic acid deficiency:  -Continue folic acid 1 mg PO daily.     6.  Prophylaxis:  -  Received 2021 influenza vaccine and Prevnar 13 vaccine.  -  He has had COVID vaccine x 2.  Recommended booster to him.    ACO / DEMI/Other  Quality measures  -  Sharif Looney received 2021 flu vaccine.  -  Sharif Looney reports a pain score of 9.  Given his pain assessment as noted, treatment options were discussed and the following options were decided upon as a follow-up plan to address the patient's pain: continuation of current treatment plan for pain.  -  Current outpatient and discharge medications have been reconciled for the patient.  Reviewed by: Kesha Velasquez, APRN      7.  F/u:  -  Will start Xgeva pending dental clearance which he has yet to do and we discussed this again today.    -  Continue to f/u with wound care  -MD follow up with C2 as scheduled.     I spent 30 minutes with Sharif Looney today.  In the office today, more than 50% of this time was spent face-to-face with him  in counseling / coordination of care, reviewing his medical history and counseling on the current treatment plan.  All questions were answered to his satisfaction      Electronically Signed by: MC uEgene    CC:   MD Robbie Ramirez APRN Weisi Yan, MD

## 2022-04-10 NOTE — PROGRESS NOTES
Wound Clinic Note  Patient Identification:  Name:  Sharif Looney  Age:  56 y.o.  Sex:  male  :  1965  MRN:  [id number]   Visit Number:  [id number]  Primary Care Physician:  Robbie Tom APRN     Subjective     Chief complaint:     Gluteal wound    History of presenting illness:     Patient is a 56 y.o. male with past medical history significant for anal cancer, and current everyday smoker. Presented today for evaluation of left gluteal wound. Reports area developed approximately 5 months ago. He states he develops skin cysts all his life. He has been diagnosed with anal cancer. He reports burning to the area.  He has been cleaning the area with alcohol and applying vaseline gauze. He did have biospy of the area confirming carcinoma. He is following with oncology. Plans for concurrent treatment with chemo and radiation, this is being worked up by oncology team at this time. Reports decreased in appetite. Reports drainage from left gluteal wound and multiple open areas to low back. Denies any fever or chills. He has been taking Doxycyline. Current everyday smoker.      2022: Patient seen in clinic today for follow up to left gluteal wound. Wound is cancer. He is following with oncology and is receiving chemo and radiation. He reports pain to thee site. Denies any fever or chills. No new issues or concerns reported.  ---------------------------------------------------------------------------------------------------------------------   Review of Systems   Constitutional: Positive for appetite change. Negative for chills and fever.   HENT: Negative for congestion and rhinorrhea.    Respiratory: Negative for cough and shortness of breath.    Cardiovascular: Negative for chest pain and leg swelling.   Gastrointestinal: Negative for nausea and vomiting.   Genitourinary: Negative for frequency.   Musculoskeletal: Positive for gait problem.   Skin: Positive for wound.   Hematological: Does not  bruise/bleed easily.     ---------------------------------------------------------------------------------------------------------------------   Past Medical History:   Diagnosis Date   • Arthritis    • Benign tumor 1982    knee   • Broken ribs    • Cancer (HCC)     anal   • Chronic bilateral low back pain without sciatica    • Degeneration of lumbar or lumbosacral intervertebral disc    • Generalized skin cysts    • History of herniated intervertebral disc    • Shoulder pain      Past Surgical History:   Procedure Laterality Date   • BACK SURGERY      discectomy   • PORTACATH PLACEMENT N/A 2/3/2022    Procedure: INSERTION OF PORTACATH;  Surgeon: Carrington Alexander MD;  Location: [facility];  Service: General;  Laterality: N/A;   • TONSILLECTOMY     • TUMOR REMOVAL      knee     Family History   Problem Relation Age of Onset   • Cancer Mother         lung   • Cancer Sister         esophageal   • Cancer Brother         throat   • Cancer Maternal Aunt         pancreatic   • Cancer Maternal Uncle         unknown type     Social History     Socioeconomic History   • Marital status: Single   Tobacco Use   • Smoking status: Current Every Day Smoker     Packs/day: 1.00     Types: Cigarettes   • Smokeless tobacco: Never Used   Vaping Use   • Vaping Use: Never used   Substance and Sexual Activity   • Alcohol use: Never   • Drug use: Never   • Sexual activity: Defer     ---------------------------------------------------------------------------------------------------------------------   Allergies:  Mupirocin  ---------------------------------------------------------------------------------------------------------------------  Objective     ---------------------------------------------------------------------------------------------------------------------   Vital Signs:     No data found.  SpO2 Percentage    03/28/22 1332   SpO2: 95%        on   ;      There is no height or weight on file to calculate BMI.  Wt Readings from  Last 3 Encounters:   03/31/22 56.2 kg (124 lb)   03/30/22 55.3 kg (122 lb)   03/30/22 55.3 kg (122 lb)     ---------------------------------------------------------------------------------------------------------------------   Physical Exam  Constitutional: Vital sign were reviewed (temperature, pulse, respiration, and blood pressure) and found to be within expected limits, general appearance was assessed and the patient was found to be in moderate distress and calm and comfortable appears  Respiratory: Normal respiratory effort and symmetrical chest expansion  Skin: Temperature:normal turgor and temperatureColor: normal, no cyanosis, jaundice, pallor or bruising, Moisture: dry,Nails: thickened yellow toenails bed, Hair:thinning to lower extremities .  Left gluteal- large area with, yellow/gray sough, scattered areas of eschar. Small amount of drainage. Edges open irregular. Tender to touch  Small open areas noted to low back, area to left aspect drainage.   ---------------------------------------------------------------------------------------------------------------------   Wound Culture   Date Value Ref Range Status   03/04/2022 Moderate growth (3+) Streptococcus gordonii (A)  Final   03/04/2022 Scant growth (1+) Normal Skin Tati  Final     No results found for: STOOLCX  No results found for: RESPCX  No results found for: MRSACX  Pain Management Panel    There is no flowsheet data to display.       I have personally reviewed the above laboratory results.     Assessment & Plan      Anal Cancer- hydrogel to wound bed. Cover with ABD pad.     Wound culture was obtained.     Current everyday smoker- Tissue perfusion is lower in users of tobacco and other forms of nicotine. Reduced tissue perfusion strongly inhibits wound healing, increases risk of infection, and dramatically increases risk of limb loss.    Follow up 1 month    MC Miller   [facility]  03/28/2022  3442

## 2022-04-13 NOTE — PROGRESS NOTES
SS received call from patient this date.  Patient requesting for  to contact Social Security office.  SS attempted to contact number provided by patient [phone] without success.  SS advised patient to have significant other to assist him with setting up an account on line with social security office to see progress of application.    Case Number: [id number]    Patient scheduled for office visit with provider and chemo on April 19 th.  Patient states he isn't sure if significant other will be bringing him or if he will use [facility].  SS advised patient to contact [facility] by this Friday, April 15 th if transportation needed by [employer].  Patient aware and agreeable.    SS will follow.

## 2022-04-19 NOTE — TELEPHONE ENCOUNTER
Caller: Sharif Looney    Relationship: Self        What was the call regarding:     TRANSPORTATION DID NOT SHOW UP, AND HAD TIRED TO GET A RIDE WAS NOT ABLE TO GET ONE FOR THIS MORNING,     WANTING TO SEE IF CAN GET IN LATER TODAY     I CALLED AND SPOKE WITH UVALDO AT THE   AND SHE WAS GOING TO FURTHER ASSIST.     I FLIPPED BACK TO SHARIF'S LINE AND WAS NOT ABLE TO HEAR HIM ON THE LINE AND THEN HIS LINE DISCONNECTED      I FLIPPED BACK TO UVALDO AND SHE ADVISED SHE WILL CHECK WITH DR LEWIS FIRST AND THEN SHE WILL CALL SHARIF RIGHT BACK REGARDING HIS APPOINTMENTS  WILL CALL BACK TO SHARIF AT THE NUMBER [phone]    I ATTEMPTED TO CALL SHARIF BACK TO LET HIM KNOW BUT VOICE MAIL FULL AND NOT ABLE TO REACH.

## 2022-04-20 NOTE — PROGRESS NOTES
SS received call from significant other, Rhiannon Iniguez [phone] who states that patient is declining and losing weight.  Rhiannon request for  to contact social security office to follow up on patient's social security.      Stacy request for  to contact insurance for depends and bed pads regarding coverage.  SS contacted [facility] [employer] [phone] per Paula who states that patient's insurance doesn't cover these supplies.  SS made significant other aware.    SS spoke with patient's nurse FELIBERTO Boyce this date.  Carli states that patient needs dental appointment for dental clearance for Xgeva.    SS contacted [facility] [phone] per Tammy who states that they do not accept patient's insurance.    SS contacted [facility] [phone] per Daphne who states that they do accept patient's insurance.  Dentist office request that patient or caregiver to call for appointment.    SS spoke with Rhiannon regarding need for dentist appointment.  Rhiannon states that patient will not go to appointment but that she will discuss this with him.  SS provided Rhiannon with name and number for dentist office.    FELIBERTO Boyce to put information in patient's discharge paperwork today.    SS met with patient this date in chemo suite bedroom 4.  Patient receiving chemotherapy.      Patient appears weak but coherent.  Patient states that he left the phone number for social security worker at home on the bed.  Patient states that he will call  later in the week with phone number.    SS received call back from Rhiannon who states that she found the phone number to social security office [phone].  Rhiannon states that she called the number and representative Mendy answered and was able to provide information to her regarding patients social security case. Worker informed caregiver that everything has been sent to doctor office and that she is to call back on Friday.    LAMAR  will follow and assist as needed.

## 2022-04-20 NOTE — PROGRESS NOTES
"NAME: Sharif Looney    : 1965    DATE:  2022    DIAGNOSIS:   Very Locally Advanced Anal Cancer with involvement of bilateral buttocks as well as metastasis to bone    TREATMENT:        CHIEF COMPLAINT:  Follow up of Anal Cancer/Toxicity check     HISTORY OF PRESENT ILLNESS:   Sharif Looney is a very pleasant 56 y.o. male who is being seen today at the request of Carrington Alexander MD for evaluation and treatment of anal cancer. Mr. Looney says he has a history of recurring cystic lesions / infections which he says occur in multiple areas over his axillae, back, perineum and buttocks.  He reports noticing an area he believed to be a cyst on his L buttock and anal opening about 1 year ago. The area did not rupture and drain like the cysts has had in the past, and continued to grow. It became increasingly large and increasingly painful and says it started to \"leak\" a little bit.  Eventually he was quite uncomfortable and he presented to Dr. Alexander for evaluation.    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 He was then referred to our office for discussion of further treatment options.     Mr. Looney is visibly uncomfortable, lying on his side or standing up to avoid sitting.  He reports feeling the urge to defecate frequently even when he doesn't have any stool to pass.  He denies constipation or liquid stool but says his stools are loose.  He denies bleeding but complains of a lot of pain.  He says he has lost 10-15 lbs over the last 6 months.  His appetite is reduced. He also reports shortness of breath with relatively minimal exertion and says he feels generally weak / fatigued.   He otherwise denies chest pain, focal weakness, incontinence,n/v/c/d or abnormal bleeding.       INTERVAL HISTORY:   Mr. Looney presents today for follow up of anal cancer/toxicity check. He has completed one cycle " of carboplatin/taxol and reports that he tolerated the treatment well with fatigue and mild nausea as his only noticeable side effects. He reports that he took zofran and compazine as directed which helped. He reports that rectal pain has improved with treatment. He continues to take Ms Contin 60 mg every 8 hours and oxycodone 10 mg 1-2 tablets, three times/day. He reports a good appetite and hydrating well. He supplements with Boost. He had 2-3 episodes of diarrhea that resolved on its own. He denies any specific complaints today.       PAST MEDICAL HISTORY:  Past Medical History:   Diagnosis Date   • Arthritis    • Benign tumor 1982    knee   • Broken ribs    • Cancer (HCC)     anal   • Chronic bilateral low back pain without sciatica    • Degeneration of lumbar or lumbosacral intervertebral disc    • Generalized skin cysts    • History of herniated intervertebral disc    • Shoulder pain         PAST SURGICAL HISTORY:  Past Surgical History:   Procedure Laterality Date   • BACK SURGERY      discectomy   • PORTACATH PLACEMENT N/A 2/3/2022    Procedure: INSERTION OF PORTACATH;  Surgeon: Carrington Alexander MD;  Location: [facility];  Service: General;  Laterality: N/A;   • TONSILLECTOMY     • TUMOR REMOVAL      knee          FAMILY HISTORY:  Family History   Problem Relation Age of Onset   • Cancer Mother         lung   • Cancer Sister         esophageal   • Cancer Brother         throat   • Cancer Maternal Aunt         pancreatic   • Cancer Maternal Uncle         unknown type       SOCIAL HISTORY:  Social History     Socioeconomic History   • Marital status: Single   Tobacco Use   • Smoking status: Current Every Day Smoker     Packs/day: 1.00     Types: Cigarettes   • Smokeless tobacco: Never Used   Vaping Use   • Vaping Use: Never used   Substance and Sexual Activity   • Alcohol use: Never   • Drug use: Never   • Sexual activity: Defer     Social History     Social History Narrative   • Not on file   Lives with a  roommate  works as a  on a big industrial machine  Current smoker 1ppd  Rarely drinks alcohol, formerly heavy drinker        REVIEW OF SYSTEMS:   A comprehensive 14 point review of systems was performed.  Significant findings as mentioned above.  All other systems reviewed and are negative.      MEDICATIONS:  The current medication list was reviewed in the EMR    Current Outpatient Medications:   •  cetirizine (zyrTEC) 10 MG tablet, Take 1 tablet by mouth Daily., Disp: , Rfl:   •  diazePAM (VALIUM) 5 MG tablet, Take 1 tab 30 min before imaging.  If needed take a second one when you arrive for the test.  May repeat one additional dose if needed.  Follow these same instructions with the next imaging test., Disp: 6 tablet, Rfl: 0  •  diclofenac (VOLTAREN) 75 MG EC tablet, Take 1 tablet by mouth 2 (Two) Times a Day., Disp: 30 tablet, Rfl: 0  •  dronabinol (Marinol) 2.5 MG capsule, Take 1 capsule by mouth 2 (Two) Times a Day Before Meals., Disp: 60 capsule, Rfl: 0  •  folic acid (FOLVITE) 1 MG tablet, Take 1 tablet by mouth Daily., Disp: 30 tablet, Rfl: 5  •  iron polysaccharides (NIFEREX) 150 MG capsule, Take 1 capsule by mouth Daily., Disp: 30 capsule, Rfl: 3  •  lidocaine-prilocaine (EMLA) 2.5-2.5 % cream, Apply to port a cath ~30-45 min prior to chemotherapy, Disp: 30 g, Rfl: 3  •  Morphine (MS CONTIN) 60 MG 12 hr tablet, Take 1 tablet by mouth Every 8 (Eight) Hours., Disp: 90 tablet, Rfl: 0  •  OLANZapine (zyPREXA) 5 MG tablet, Take 1 tablet by mouth Every Night. Take on days 2, 3 and 4 after chemotherapy., Disp: 3 tablet, Rfl: 5  •  ondansetron (ZOFRAN) 8 MG tablet, Take 1 tablet by mouth 3 (Three) Times a Day As Needed for Nausea or Vomiting., Disp: 30 tablet, Rfl: 5  •  oxyCODONE (ROXICODONE) 10 MG tablet, Take 1-2 tablets every 4-6 hours as needed for moderate pain., Disp: 200 tablet, Rfl: 0  •  prochlorperazine (COMPAZINE) 10 MG tablet, Take 1 tablet by mouth Every 6 (Six) Hours As Needed for Nausea.,  Disp: 60 tablet, Rfl: 3  •  sennosides-docusate (senna-docusate sodium) 8.6-50 MG per tablet, Take 2 tablets by mouth Daily., Disp: 60 tablet, Rfl: 5  •  tiZANidine (ZANAFLEX) 4 MG tablet, Take 1 tablet by mouth Every 8 (Eight) Hours As Needed for Muscle Spasms., Disp: 30 tablet, Rfl: 0  No current facility-administered medications for this visit.    Facility-Administered Medications Ordered in Other Visits:   •  heparin 100 UNIT/ML injection  - ADS Override Pull, , , ,   Zyrtec 10 mg daily     ALLERGIES:    Allergies   Allergen Reactions   • Mupirocin Hives     Pt states this does not sound like the antibiotic that he is  Allergic to       PHYSICAL EXAM:  Vitals:    04/20/22 0934   BP: 91/60   Pulse: (!) 155   Resp: 18   Temp: 96.9 °F (36.1 °C)   SpO2: 99%     Pain Score    04/20/22 0934   PainSc: 10-Worst pain ever           HR Recheck 120  General:  Awake, alert and oriented, in no acute distress. Lying in bed, appears comfortable.  HEENT:  Pupils are equal, round and reactive to light and accommodation, Extra-ocular movements full, Oropharyx clear, mucous membranes moist  Neck:  No JVD, thyromegaly or lymphadenopathy  CV:  Regular tachycardia, no murmurs, rubs or gallops  Resp:  Lungs are clear to auscultation bilaterally  Abd:  Soft, non-tender, non-distended, bowel sounds present, no organomegaly or masses  Ext:  No clubbing, cyanosis or edema  Neuro:  MS as above, grossly non-focal exam    PATHOLOGY:  01-11-22      ENDOSCOPY:  N/a      IMAGING:  NM PET/CT Skull Base to Mid Thigh 02-18-22  PET/CT FINDINGS: The PET and CT images acquired from the base of the  skull to the thoracic inlet showed some muscle uptake from movement  during the incubation period. At the thoracic inlet the thyroid gland  was enlarged. There is intense hypermetabolic activity in the  sternoclavicular joint. On the left there is some bony destructive  change in this region. This is concerning for bony metastatic disease.  The SUV value  is 8.5. There is also some increased glucose uptake  diffusely throughout the marrow of the spine. This can be related to  chemotherapy. The lungs were both well aerated. no hypermetabolic  activity or focal lung nodules were demonstrated. There is no increased  glucose uptake in the mediastinum or hilar areas. The liver was  unremarkable and showed no glucose uptake. Spleen was not enlarged.  There were no masses in the adrenal glands. There were no enlarged or  hypermetabolic lymph nodes in the retroperitoneum. The aorta was normal  in caliber. In the lumbar spine there is intense hypermetabolic activity  in the L5 vertebral body consistent with metastatic disease. There are  enlarged lymph nodes in the groin areas bilaterally. The largest lymph  node is on the right with a diameter of 3 cm. This node is intensely  hypermetabolic with an SUV of 7.5. There is muscle uptake in the pelvis  felt to most likely be due to movement during the incubation period.  There are other enlarged lymph nodes in the left inguinal area that also  show glucose uptake. These nodes measure up to 15 mm in size. There were  no hypermetabolic or enlarged lymph nodes along the iliac lymph node  chains. There is intense hypermetabolic activity in the soft tissues  around the left buttock which is markedly thickened. The skin thickening  measures up to 4 cm. This extends into the perirectal area and into the  soft tissues of the cheek on the right side as well. This area has a  intense hypermetabolic activity with an SUV of 28.6.     IMPRESSION:  Abnormal PET fusion CT showing a large mass involving the  skin surface which is markedly thickened in the perirectal area  extending into both buttocks regions more prominent on the left. This  area has an SUV of 28.6. There is evidence of metastatic disease to the  inguinal lymph nodes. The most intense lymph node in the largest node is  in the right groin. There is also evidence of bony  metastatic disease  with intense activity in the L5 vertebral body and in the  sternoclavicular joint on the left side. There is mild bone marrow  activity throughout the spine.      RECENT LABS:  Lab Results   Component Value Date    WBC 32.09 (C) 04/20/2022    HGB 8.0 (L) 04/20/2022    HCT 26.3 (L) 04/20/2022    MCV 84.3 04/20/2022    RDW 19.5 (H) 04/20/2022     04/20/2022    NEUTRORELPCT 85.7 (H) 04/20/2022    LYMPHORELPCT 8.1 (L) 04/20/2022    MONORELPCT 4.2 (L) 04/20/2022    EOSRELPCT 0.1 (L) 04/20/2022    BASORELPCT 0.3 04/20/2022    NEUTROABS 27.53 (H) 04/20/2022    LYMPHSABS 2.60 04/20/2022     Lab Results   Component Value Date     (L) 04/20/2022    K 3.4 (L) 04/20/2022    CO2 24.3 04/20/2022    CL 91 (L) 04/20/2022    BUN 12 04/20/2022    CREATININE 0.68 (L) 04/20/2022    EGFRIFNONA 125 01/26/2022    GLUCOSE 174 (H) 04/20/2022    CALCIUM 10.9 (H) 04/20/2022    ALKPHOS 164 (H) 04/20/2022    AST 12 04/20/2022    ALT 16 04/20/2022    BILITOT 0.3 04/20/2022    ALBUMIN 2.28 (L) 04/20/2022    PROTEINTOT 7.4 04/20/2022    MG 1.9 03/28/2022    PHOS 2.7 03/28/2022     Lab Results   Component Value Date    FERRITIN 323.20 01/26/2022    IRON 10 (L) 01/26/2022    TIBC 253 (L) 01/26/2022    LABIRON 4 (L) 01/26/2022    LRHVYHTP65 442 01/26/2022    FOLATE 4.07 (L) 01/26/2022 01-26-22  HIV-1/ HIV-2   Non-Reactive Non-Reactive            ASSESSMENT & PLAN:  Sharif Looney is a very pleasant 56 y.o. male with newly diagnosed very locally advanced invasive moderately differentiated keratinizing squamous cell carcinoma of the anus involving the bilateral buttocks with metastasis also to a large R inguinal LN as well as to bone (L5 vertebral body, L sternoclavicular joint).    1.  Anal Cancer:  -  He has very locally advanced disease as well as evidence of metastasis as noted on PET.  -  Cancelled MRI as he is not able to tolerate this at this time.  -  We discussed that his cancer is treatable but not curable  given bony metastatic spread.  -  Given metastatic disease, recommended palliative chemotherapy to try to control his cancer with Carboplatin/Taxol q21d with growth factor support with Xgeva monthly for bony metastasis.  We discussed potential risks, benefits and side effects and he would like to proceed.  - He has completed his first cycle and overall tolerated this well. We again discussed the need for dental clearance prior to starting Xgeva, he is working on finding a dentist that will accept his insurance.  - Exam and labs from today without cause for concern. Will proceed with cycle 2 carboplatin/taxol today as planned.     2.  Neoplasm related pain:  - Improved following his first treatment. Continue MSContin 60 mg q8h.  Continue Oxycodone 10 mg 1-2 tabs q4-6h prn (taking ~2 tabs TID).   -  Continue Senna/Colace ii BID with Miralax as needed.    3.  Weight loss:   - Continue to use Boost to maximize caloric intake. He has been gaining weight recently. Continue to push oral hydration.     4. Hypokalemia:  - Reports 2-3 episodes of diarrhea that resolved on its own. Encouraged to push oral hydration. Will give one liter NS with treatment today.   - Potassium ~3.4 today. Will give KCl 40 meq PO x 1 dose now.    5. Folic acid deficiency:  - Continue folic acid 1 mg PO daily.     6. Hypercalcemia:  - Calcium ~10.8 today. Awaiting dental clearance to start Xgeva. Will give one liter NS today.     7.  Prophylaxis:  -  Received 2021 influenza vaccine and Prevnar 13 vaccine.  -  He has had COVID vaccine x 2.  Recommended booster to him.    8. Follow up:  - Will start Xgeva pending dental clearance which he has yet to do and we discussed this again today.   - Continue to f/u with wound care.  - With Dr. Peralta on day of cycle 3 carboplatin/taxol.       I spent 30 minutes with Sharif Looney today.  In the office today, more than 50% of this time was spent face-to-face with him  in counseling / coordination of care,  reviewing his medical history and counseling on the current treatment plan.  All questions were answered to his satisfaction      Electronically Signed by: MC Ramirez    CC:   MD Robbie Ramirez APRN Weisi Yan, MD

## 2022-04-20 NOTE — PATIENT INSTRUCTIONS
For Dental Clearance:  [facility], [place]  Option #2 for dentist  Phone number : [phone]  Request Dental Clearance for Xgeva.

## 2022-04-22 NOTE — TELEPHONE ENCOUNTER
"Rhiannon Geetha who is patients friend that he lives with called yesterday (4-21) reporting patient seems to weak and is not agreeable to come in for his Udenyca. Educated patients friend on the importance of receiving the injection. She verbalizes understanding and reports she will try to get patient to come the next day on 4-22. Today Rhiannon calls back reporting she got patient up early this morning and got him dressed to come to the clinic to receive his injection. She reports [facility] came and patient was unable to get up and get to [facility]. She reports patient \"half way fell\" that he was alf on the bed and the floor. She reports patient is saying he is fine and is not hurt from the fall but that he is not able to come today. Educated patients friend again on the importance of the injection and she verbalizes understanding. Educated Rhiannon that is she feels patient has had a significant change in condition or if he needs to be seen and evaluated in the ER to call for an ambulance and have the patient brought to the ER. She reports the last time she did that that the patient refused to go come to the hospital. She reports patient is saying he is not hurt from the fall and is fine but that he is not able to come to the clinic today to receive his injection. Dr Peralta made aware.   "

## 2022-04-23 PROBLEM — Z51.5 COMFORT MEASURES ONLY STATUS: Status: ACTIVE | Noted: 2022-01-01

## 2022-04-23 NOTE — PROGRESS NOTES
Wound Clinic Note  Patient Identification:  Name:  Sharif Looney  Age:  56 y.o.  Sex:  male  :  1965  MRN:  [id number]   Visit Number:  [id number]  Primary Care Physician:  Robbie Tom APRN     Subjective     Chief complaint:     Gluteal wound    History of presenting illness:     Patient is a 56 y.o. male with past medical history significant for anal cancer, and current everyday smoker. Presented today for evaluation of left gluteal wound. Reports area developed approximately 5 months ago. He states he develops skin cysts all his life. He has been diagnosed with anal cancer. He reports burning to the area.  He has been cleaning the area with alcohol and applying vaseline gauze. He did have biospy of the area confirming carcinoma. He is following with oncology. Plans for concurrent treatment with chemo and radiation, this is being worked up by oncology team at this time. Reports decreased in appetite. Reports drainage from left gluteal wound and multiple open areas to low back. Denies any fever or chills. He has been taking Doxycyline. Current everyday smoker.      2022: Patient seen in clinic today for follow up to left gluteal wound. Wound is cancer. He is following with oncology and is receiving chemo and radiation. He reports pain to thee site. Denies any fever or chills. No new issues or concerns reported.    2022: Patient seen in clinic today for follow up to left gluteal wound. Wound remains unchanged. He is following with oncology and is receiving chemo and radiation. He denies any new issues or concerns. Denies any fever or chills. Mild pain reported to site.  ---------------------------------------------------------------------------------------------------------------------   Review of Systems   Constitutional: Positive for appetite change. Negative for chills and fever.   HENT: Negative for congestion and rhinorrhea.    Respiratory: Negative for cough and shortness of  breath.    Cardiovascular: Negative for chest pain and leg swelling.   Gastrointestinal: Negative for nausea and vomiting.   Genitourinary: Negative for frequency.   Musculoskeletal: Positive for gait problem.   Skin: Positive for wound.   Hematological: Does not bruise/bleed easily.     ---------------------------------------------------------------------------------------------------------------------   Past Medical History:   Diagnosis Date   • Arthritis    • Benign tumor 1982    knee   • Broken ribs    • Cancer (HCC)     anal   • Chronic bilateral low back pain without sciatica    • Degeneration of lumbar or lumbosacral intervertebral disc    • Generalized skin cysts    • History of herniated intervertebral disc    • Shoulder pain      Past Surgical History:   Procedure Laterality Date   • BACK SURGERY      discectomy   • PORTACATH PLACEMENT N/A 2/3/2022    Procedure: INSERTION OF PORTACATH;  Surgeon: Carrington Alexander MD;  Location: [facility];  Service: General;  Laterality: N/A;   • TONSILLECTOMY     • TUMOR REMOVAL      knee     Family History   Problem Relation Age of Onset   • Cancer Mother         lung   • Cancer Sister         esophageal   • Cancer Brother         throat   • Cancer Maternal Aunt         pancreatic   • Cancer Maternal Uncle         unknown type     Social History     Socioeconomic History   • Marital status: Single   Tobacco Use   • Smoking status: Current Every Day Smoker     Packs/day: 1.00     Types: Cigarettes   • Smokeless tobacco: Never Used   Vaping Use   • Vaping Use: Never used   Substance and Sexual Activity   • Alcohol use: Never   • Drug use: Never   • Sexual activity: Defer     ---------------------------------------------------------------------------------------------------------------------   Allergies:  Mupirocin  ---------------------------------------------------------------------------------------------------------------------  Objective      ---------------------------------------------------------------------------------------------------------------------   Vital Signs:     No data found.  There were no vitals filed for this visit.     on   ;      There is no height or weight on file to calculate BMI.  Wt Readings from Last 3 Encounters:   04/23/22 49 kg (108 lb)   04/20/22 49.4 kg (108 lb 12.8 oz)   04/20/22 49.4 kg (108 lb 12.8 oz)     ---------------------------------------------------------------------------------------------------------------------   Physical Exam  Constitutional: Vital sign were reviewed (temperature, pulse, respiration, and blood pressure) and found to be within expected limits, general appearance was assessed and the patient was found to be in moderate distress and calm and comfortable appears  Respiratory: Normal respiratory effort and symmetrical chest expansion  Skin: Temperature:normal turgor and temperatureColor: normal, no cyanosis, jaundice, pallor or bruising, Moisture: dry,Nails: thickened yellow toenails bed, Hair:thinning to lower extremities .  Left gluteal- large area with, yellow/gray sough, scattered areas of eschar. Small amount of drainage. Edges open irregular. Tender to touch  Small open areas noted to low back, area to left aspect drainage.   ---------------------------------------------------------------------------------------------------------------------   Wound Culture   Date Value Ref Range Status   03/04/2022 Moderate growth (3+) Streptococcus gordonii (A)  Final   03/04/2022 Scant growth (1+) Normal Skin Tati  Final     No results found for: STOOLCX  No results found for: RESPCX  No results found for: MRSACX  Pain Management Panel    There is no flowsheet data to display.       I have personally reviewed the above laboratory results.     Assessment & Plan      Anal Cancer- hydrogel to wound bed. Cover with ABD pad.     Wound culture was obtained.     Current everyday smoker- Tissue perfusion is  lower in users of tobacco and other forms of nicotine. Reduced tissue perfusion strongly inhibits wound healing, increases risk of infection, and dramatically increases risk of limb loss.    Follow up 1 month    MC Miller   [facility]  04/11/2022  [id number]

## 2022-04-24 LAB
QT INTERVAL: 338 MS
QTC INTERVAL: 402 MS

## 2022-04-25 ENCOUNTER — APPOINTMENT (OUTPATIENT)
Dept: WOUND CARE | Facility: HOSPITAL | Age: 57
End: 2022-04-25

## 2022-04-26 LAB — GLUCOSE BLDC GLUCOMTR-MCNC: 136 MG/DL (ref 70–130)

## 2022-04-28 LAB
BACTERIA SPEC AEROBE CULT: NORMAL
BACTERIA SPEC AEROBE CULT: NORMAL

## (undated) DEVICE — PK BASIC 70

## (undated) DEVICE — DRP C/ARM W/BAND W/CLIPS 41X74IN

## (undated) DEVICE — SYR LL TP 10ML STRL

## (undated) DEVICE — NDL HYPO ECLPS SFTY 22G 1 1/2IN

## (undated) DEVICE — GLV SURG PREMIERPRO MIC LTX PF SZ7.5 BRN

## (undated) DEVICE — SYR LUERLOK 30CC

## (undated) DEVICE — TBG PENCL TELESCP MEGADYNE SMOKE EVAC 10FT

## (undated) DEVICE — NDL HYPO ECLPS SFTY 18G 1 1/2IN

## (undated) DEVICE — DRAPE,T,LAPARO,TRANS,STERILE: Brand: MEDLINE

## (undated) DEVICE — SUT VIC 3/0 SH 27IN J416H

## (undated) DEVICE — PATIENT RETURN ELECTRODE, SINGLE-USE, CONTACT QUALITY MONITORING, ADULT, WITH 9FT CORD, FOR PATIENTS WEIGING OVER 33LBS. (15KG): Brand: MEGADYNE

## (undated) DEVICE — APPL CHLORAPREP HI/LITE 26ML ORNG

## (undated) DEVICE — HOLDER: Brand: DEROYAL

## (undated) DEVICE — ANTIBACTERIAL UNDYED BRAIDED (POLYGLACTIN 910), SYNTHETIC ABSORBABLE SUTURE: Brand: COATED VICRYL